# Patient Record
Sex: FEMALE | Race: BLACK OR AFRICAN AMERICAN | Employment: FULL TIME | URBAN - METROPOLITAN AREA
[De-identification: names, ages, dates, MRNs, and addresses within clinical notes are randomized per-mention and may not be internally consistent; named-entity substitution may affect disease eponyms.]

---
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
## 2020-03-18 ENCOUNTER — TELEPHONE (OUTPATIENT)
Dept: OBGYN CLINIC | Age: 20
End: 2020-03-18

## 2020-03-18 NOTE — TELEPHONE ENCOUNTER
Kim Means MD Debrah Hearing, RN   Caller: Unspecified (Today, 11:09 AM)             Please send in rx for diflucan 150mg PO 1 tab every 3 days for 2 doses. #2 tabs. If she is not better in 1 week, she should call for appt     This nurse called the patient back and she went to [facility] er and was given a prescription for Flagyl and told her she had BV. This nurse did not sent the prescription for diflucan.     patient verbalized understanding.     ABRAHAM

## 2020-03-18 NOTE — TELEPHONE ENCOUNTER
Call received tracy 11:05  SP patient      23year old patient last seen in the office on 11/29/2019      Patient calling to say that she has no itching but has fishy odor and thick white discharge. Patient thinks she has a yeast infection and states monistat does not work for her. Patient calling also about abnormal bleeding between her periods that is heavy and she is changing her tampon and pad every 1.5 hours. Patient reports when she is bleeding she has cramping at 6 on the pain scale of 1-10. Patient reports she has had the abnormal bleeding for about 4-5months    Patient is currently not bleeding and does not have cramping    Patient is requesting to be seen. ? Ultrasound?  Ov    Please advise    Pharmacy confirmed

## 2020-03-18 NOTE — TELEPHONE ENCOUNTER
Clary, MD Johnnie Doan, RN   Caller: Unspecified (Today, 11:09 AM)             Call in rx if she requests and if she feels her sx are urgent she can schedule an appt but please advise her we have very limited staff in the office right now due to [place]       what medication do you want to to have sent in for the patient     Please provide name of medication and signature, dosage

## 2020-04-14 ENCOUNTER — VIRTUAL VISIT (OUTPATIENT)
Dept: OBGYN CLINIC | Age: 20
End: 2020-04-14

## 2020-04-14 ENCOUNTER — TELEPHONE (OUTPATIENT)
Dept: OBGYN CLINIC | Age: 20
End: 2020-04-14

## 2020-04-14 VITALS — BODY MASS INDEX: 32.18 KG/M2 | HEIGHT: 67 IN | WEIGHT: 205 LBS

## 2020-04-14 DIAGNOSIS — B37.9 YEAST INFECTION: Primary | ICD-10-CM

## 2020-04-14 RX ORDER — FLUCONAZOLE 150 MG/1
150 TABLET ORAL DAILY
Qty: 1 TAB | Refills: 0 | Status: SHIPPED | OUTPATIENT
Start: 2020-04-14 | End: 2020-04-15

## 2020-04-14 NOTE — PROGRESS NOTES
[facility]    Coco Rausch is a 23 y.o. female who was seen by synchronous (real-time) audio-video technology on 2020. Constitutional  · Appearance: well-nourished, well developed, alert, in no acute distress    HENT  · Head and Face: appears normal    Chest  · Respiratory Effort: normal effort, non-labored breathing    Skin  · General Inspection: no rash, no discolorations, no lesions identified on visible areas    Neurologic  · No gross deficits    Psychiatric  · Mental Status:  · Orientation: grossly oriented to person, place and time  · Mood and Affect: mood normal, affect appropriate, behavior/speech/dress appropriate      We discussed the expected course, resolution and complications of the diagnosis(es) in detail. Medication risks, benefits, costs, interactions, and alternatives were discussed as indicated. I advised her to contact the office if her condition worsens, changes or fails to improve as anticipated. She expressed understanding with the diagnosis(es) and plan. Pursuant to the emergency declaration under the [address] [place] [address], [facility] waiver authority and the [employer] and [employer] Act, this Virtual  Visit was conducted, with patient's consent, to reduce the patient's risk of exposure to COVID-19 and provide continuity of care for an established patient. Services were provided through a video synchronous discussion virtually to substitute for in-person clinic visit. Vaginitis  CC: Vaginal discharge    HPI: Ms. Coco Rausch is a 23 y.o. [de-identified]  female presenting for evaluation of vaginal discharge. She has no additional complaints. She has not had previous treatment for this problem. Onset: 4 days  Location: vaginal  Quality: thick  Severity: bothersome  Timin days ago    Odor: no  Relation to menses? no  Pruritis? no  Irritation? yes  New lesions? no  Dysuria? no  Pain? no    History of recurrent vaginal/vulvar infections? yes  History of recent STIs? no  New partners? yes   She uses condoms or barrier contraception:  never    OB History        0    Para   0    Term   0       0    AB   0    Living   0       SAB   0    TAB   0    Ectopic   0    Molar   0    Multiple   0    Live Births   0                Past Medical History:   Diagnosis Date    Chlamydia     Depression     Overweight (BMI 25.0-29. 9)        History reviewed. No pertinent surgical history.     Family History   Problem Relation Age of Onset    No Known Problems Mother     Colon Cancer Father     Kidney Disease Maternal Uncle     Kidney Disease Maternal Grandfather     Diabetes Paternal Grandmother        Social History     Socioeconomic History    Marital status: SINGLE     Spouse name: Not on file    Number of children: Not on file    Years of education: Not on file    Highest education level: Not on file   Occupational History    Not on file   Social Needs    Financial resource strain: Not on file    Food insecurity     Worry: Not on file     Inability: Not on file    Transportation needs     Medical: Not on file     Non-medical: Not on file   Tobacco Use    Smoking status: Never Smoker    Smokeless tobacco: Never Used   Substance and Sexual Activity    Alcohol use: No    Drug use: No    Sexual activity: Yes     Partners: Male     Birth control/protection: None   Lifestyle    Physical activity     Days per week: Not on file     Minutes per session: Not on file    Stress: Not on file   Relationships    Social connections     Talks on phone: Not on file     Gets together: Not on file     Attends Shinto service: Not on file     Active member of club or organization: Not on file     Attends meetings of clubs or organizations: Not on file     Relationship status: Not on file    Intimate partner violence     Fear of current or ex partner: Not on file     Emotionally abused: Not on file     Physically abused: Not on file     Forced sexual activity: Not on file   Other Topics Concern    Not on file   Social History Narrative    Senior in [place]. Wants to go to college for nursing degree. Either [employer] or [employer]. Current Outpatient Medications   Medication Sig Dispense Refill    metroNIDAZOLE (FLAGYL) 500 mg tablet Take 4 tablets now.   Then take 1 tablet twice daily x 7 days 18 Tab 0       Allergies   Allergen Reactions    Cherry Flavor Other (comments)     Throat swells       Review of Systems - History obtained from the patient  Constitutional: negative for weight loss, fever, night sweats  HEENT: negative for hearing loss, earache, congestion, snoring, sorethroat  CV: negative for chest pain, palpitations, edema  Resp: negative for cough, shortness of breath, wheezing  GI: negative for change in bowel habits, abdominal pain, black or bloody stools  : negative for frequency, dysuria, hematuria, + vaginal discharge as HPI  MSK: negative for back pain, joint pain, muscle pain  Skin :negative for itching, rash, hives  Neuro: negative for dizziness, headache, confusion, weakness  Psych: negative for anxiety, depression, change in mood  Heme/lymph: negative for bleeding, bruising, pallor    Physical Exam      Results for orders placed or performed in visit on 11/29/19   NUSWAB VAGINOSIS + CANDIDA   Result Value Ref Range    Atopobium vaginae Moderate - 1 Score    BVAB 2 Moderate - 1 Score    Megasphaera 1 Low - 0 Score    C. albicans, GIUSEPPE Negative Negative    C. glabrata, GIUSEPPE Negative Negative   NUSWAB VAGINITIS PLUS   Result Value Ref Range    Atopobium vaginae CANCELED Score    BVAB 2 CANCELED Score    Megasphaera 1 CANCELED Score    C. albicans, GIUSEPPE CANCELED     C. glabrata, GIUSEPPE CANCELED     T. vaginalis, GIUSEPPE Negative Negative    C. trachomatis, GIUSEPPE Negative Negative    N. gonorrhoeae, GIUSEPPE Negative Negative   SPECIMEN STATUS REPORT   Result Value Ref Range SPECIMEN STATUS REPORT COMMENT    AMB POC URINE PREGNANCY TEST, VISUAL COLOR COMPARISON   Result Value Ref Range    VALID INTERNAL CONTROL POC Yes     HCG urine, Ql. (POC) Negative Negative       Assessment/Plan:  Vaginitis   Rx for diflucan sent. Counseled on safe sexual practices (barrier contraception). Reviewed vulvar/vaginal hygiene and irritant avoidance. RTC: for annual , or sooner prn for problems or concerns. Handouts and instructions provided.     Jesús Wilson  4/14/2020  1:22 PM    Signed By: Eloy Norton MD     April 14, 2020

## 2020-04-14 NOTE — PATIENT INSTRUCTIONS

## 2020-04-14 NOTE — TELEPHONE ENCOUNTER
SP patient (sending to work in [employer])      Patient recently was seen at [facility] for [address] and was given Flagyl. She now feels she has cleared from the [address] but feels itchy with white thick discharge. She feels that she is irritated and has a possible yeast infection. Monistat 3 did not improve things. Please advise. Requesting treatment for yeast to try.       [address] [facility]

## 2020-04-15 ENCOUNTER — TELEPHONE (OUTPATIENT)
Dept: OBGYN CLINIC | Age: 20
End: 2020-04-15

## 2020-04-15 NOTE — TELEPHONE ENCOUNTER
The medicine has not had time to work. No face to face visit here.  If she wants that she can [facility].

## 2020-04-15 NOTE — TELEPHONE ENCOUNTER
Patient of Evelin Paulson. She is calling rosi this morning saying that she was seen through telemedicine visit and her yeast issues are not gone. She thinks the medication did not work. I explained that the medication does not work that fast.    In speaking with patient, she had a new sexual partner that she had unprotected intercourse with. Today she continues to have vaginal irritation, discharge (white thick), no odor, no itching and no burning. No urinary pain.       Please advise, as now she is requesting a face to face visit and I know she has not given the medication time to take effect if truly is yeast.

## 2020-07-07 ENCOUNTER — OFFICE VISIT (OUTPATIENT)
Dept: OBGYN CLINIC | Age: 20
End: 2020-07-07

## 2020-07-07 VITALS
WEIGHT: 229 LBS | DIASTOLIC BLOOD PRESSURE: 70 MMHG | SYSTOLIC BLOOD PRESSURE: 116 MMHG | HEIGHT: 67 IN | BODY MASS INDEX: 35.94 KG/M2

## 2020-07-07 DIAGNOSIS — Z11.3 SCREENING FOR VENEREAL DISEASE: Primary | ICD-10-CM

## 2020-07-07 DIAGNOSIS — E66.01 SEVERE OBESITY (HCC): ICD-10-CM

## 2020-07-07 DIAGNOSIS — N89.8 VAGINAL DISCHARGE: ICD-10-CM

## 2020-07-07 DIAGNOSIS — N89.8 VAGINAL ODOR: ICD-10-CM

## 2020-07-07 NOTE — PROGRESS NOTES
Problem Visit    Carlton Carmona is a 23 y.o.  A1 presenting for problem visit. Her main concern today is white, vaginal discharge and fishy odor. +sexually active, not using protection. Also with HMB (regular cycle), clots and cramping. She is not interested in hormonal contraception to regulate cycles as she wishes to conceive. Reports 1 year of unprotected IC prior to sab, and has now been ttc for several months since that time without resulting pregnancy. Ob/Gyn Hx:   A1 - 1 SAB (early chemical pregnancy)  LMP-20  Menses- regular  Contraception-none  STI- Denies  ? SA-yes    Health maintenance:  Pap-not indicated  Gardasil-0/3    Past Medical History:   Diagnosis Date    Chlamydia     Depression     Overweight (BMI 25.0-29. 9)        No past surgical history on file.     Family History   Problem Relation Age of Onset    No Known Problems Mother     Colon Cancer Father     Kidney Disease Maternal Uncle     Kidney Disease Maternal Grandfather     Diabetes Paternal Grandmother        Social History     Socioeconomic History    Marital status: SINGLE     Spouse name: Not on file    Number of children: Not on file    Years of education: Not on file    Highest education level: Not on file   Occupational History    Not on file   Social Needs    Financial resource strain: Not on file    Food insecurity     Worry: Not on file     Inability: Not on file    Transportation needs     Medical: Not on file     Non-medical: Not on file   Tobacco Use    Smoking status: Never Smoker    Smokeless tobacco: Never Used   Substance and Sexual Activity    Alcohol use: No    Drug use: No    Sexual activity: Yes     Partners: Male     Birth control/protection: None   Lifestyle    Physical activity     Days per week: Not on file     Minutes per session: Not on file    Stress: Not on file   Relationships    Social connections     Talks on phone: Not on file     Gets together: Not on file Attends Mu-ism service: Not on file     Active member of club or organization: Not on file     Attends meetings of clubs or organizations: Not on file     Relationship status: Not on file    Intimate partner violence     Fear of current or ex partner: Not on file     Emotionally abused: Not on file     Physically abused: Not on file     Forced sexual activity: Not on file   Other Topics Concern    Not on file   Social History Narrative    Senior in [place]. Wants to go to college for nursing degree. Either [employer] or [employer] [facility] [employer]. Current Outpatient Medications   Medication Sig Dispense Refill    metroNIDAZOLE (FLAGYL) 500 mg tablet Take 4 tablets now.   Then take 1 tablet twice daily x 7 days 18 Tab 0       Allergies   Allergen Reactions    Cherry Flavor Other (comments)     Throat swells       Review of Systems - History obtained from the patient  Constitutional: negative for weight loss, fever, night sweats  HEENT: negative for hearing loss, earache, congestion, snoring, sorethroat  CV: negative for chest pain, palpitations, edema  Resp: negative for cough, shortness of breath, wheezing  GI: negative for change in bowel habits, abdominal pain, black or bloody stools  : negative for frequency, dysuria, hematuria, vaginal discharge, +vaginal discharge and odor  MSK: negative for back pain, joint pain, muscle pain  Breast: negative for breast lumps, nipple discharge, galactorrhea  Skin :negative for itching, rash, hives  Neuro: negative for dizziness, headache, confusion, weakness  Psych: negative for anxiety, depression, change in mood  Heme/lymph: negative for bleeding, bruising, pallor    Physical Exam  Visit Vitals  /70 (BP 1 Location: Left arm, BP Patient Position: Sitting)   Ht 5' 7\" (1.702 m)   Wt 229 lb (103.9 kg)   LMP 06/24/2020   BMI 35.87 kg/m²       Constitutional  · Appearance: well-nourished, well developed, alert, in no acute distress    HENT  · Head and Face: appears normal    Chest  · Respiratory Effort: non-labored breathing  · Auscultation: CTAB, normal breath sounds    Cardiovascular  · Heart:  · Auscultation: regular rate and rhythm without murmur  · Extremities: no peripheral edema  ·   Gastrointestinal  · Abdominal Examination: abdomen non-tender to palpation, normal bowel sounds, no masses present  · Liver and spleen: no hepatomegaly present, spleen not palpable  · Hernias: no hernias identified    Genitourinary  · External Genitalia: normal appearance for age, no discharge present, no tenderness present, no inflammatory lesions present, no masses present, no atrophy present  · Vagina: normal vaginal vault without central or paravaginal defects, no inflammatory lesions present, no masses present, small amount of thin white discharge, minimal vaginal erythema  · Bladder: non-tender to palpation  · Urethra: appears normal  · Cervix: normal   · Uterus: normal size, shape and consistency  · Adnexa: no adnexal tenderness present, no adnexal masses present  · Perineum: perineum within normal limits, no evidence of trauma, no rashes or skin lesions present    Skin  · General Inspection: no rash, no lesions identified    Neurologic/Psychiatric  · Mental Status:  · Orientation: grossly oriented to person, place and time  · Mood and Affect: mood normal, affect appropriate      Assessment/Plan:  23 y.o. with c/o malodorous vaginal discharge.      -nuswab plus sent today (r/o BV, yeast, and STIs), treatment pending results  -counseled on good hygiene, avoidance of douching, safe sexual practices, condom use, etc  -advised initiation of PNV and toxin avoidance if ttc    RTC: for AE or sooner prn    Rosemarie Yin MD  7/7/2020  2:57 PM

## 2020-07-14 LAB
A VAGINAE DNA VAG QL NAA+PROBE: ABNORMAL SCORE
BVAB2 DNA VAG QL NAA+PROBE: ABNORMAL SCORE
C ALBICANS DNA VAG QL NAA+PROBE: POSITIVE
C GLABRATA DNA VAG QL NAA+PROBE: NEGATIVE
C TRACH DNA VAG QL NAA+PROBE: NEGATIVE
MEGA1 DNA VAG QL NAA+PROBE: ABNORMAL SCORE
N GONORRHOEA DNA VAG QL NAA+PROBE: NEGATIVE
T VAGINALIS DNA VAG QL NAA+PROBE: NEGATIVE

## 2020-07-14 NOTE — PROGRESS NOTES
+BV. Please inform patient and send Rx for flagyl 500mg BID x7 days to patient's pharmacy. Please advise no ETOH with this medication. + yeast. Please inform patient and send Rx for diflucan 150mg PO x1 (with 1 refill) to patient's pharmacy. Thank you.

## 2020-07-15 RX ORDER — FLUCONAZOLE 150 MG/1
150 TABLET ORAL DAILY
Qty: 1 TAB | Refills: 1 | Status: SHIPPED | OUTPATIENT
Start: 2020-07-15 | End: 2020-07-16

## 2020-07-15 RX ORDER — METRONIDAZOLE 500 MG/1
500 TABLET ORAL 2 TIMES DAILY
Qty: 14 TAB | Refills: 0 | Status: SHIPPED | OUTPATIENT
Start: 2020-07-15 | End: 2020-07-22

## 2021-02-04 ENCOUNTER — OFFICE VISIT (OUTPATIENT)
Dept: OBGYN CLINIC | Age: 21
End: 2021-02-04
Payer: MEDICAID

## 2021-02-04 VITALS
BODY MASS INDEX: 35.31 KG/M2 | DIASTOLIC BLOOD PRESSURE: 84 MMHG | SYSTOLIC BLOOD PRESSURE: 126 MMHG | HEIGHT: 67 IN | WEIGHT: 225 LBS

## 2021-02-04 DIAGNOSIS — N93.9 ABNORMAL UTERINE BLEEDING (AUB): ICD-10-CM

## 2021-02-04 DIAGNOSIS — R10.2 PELVIC PAIN IN FEMALE: ICD-10-CM

## 2021-02-04 DIAGNOSIS — Z72.51 UNPROTECTED SEXUAL INTERCOURSE: Primary | ICD-10-CM

## 2021-02-04 LAB
HCG URINE, QL. (POC): NEGATIVE
VALID INTERNAL CONTROL?: YES

## 2021-02-04 PROCEDURE — 81025 URINE PREGNANCY TEST: CPT | Performed by: OBSTETRICS & GYNECOLOGY

## 2021-02-04 PROCEDURE — 99214 OFFICE O/P EST MOD 30 MIN: CPT | Performed by: OBSTETRICS & GYNECOLOGY

## 2021-02-04 NOTE — PATIENT INSTRUCTIONS
Pelvic Exam: Care Instructions Your Care Instructions When your doctor examines all of your pelvic organs, it's called a pelvic exam. Two good reasons to have this kind of exam are to check for sexually transmitted infections (STIs) and to get a Pap test. A Pap test is also called a Pap smear. It checks for early changes that can lead to cancer of the cervix. Sometimes a pelvic exam is part of a regular checkup. Your doctor may ask you to avoid vaginal sex, tampons, vaginal medicines, vaginal sprays or powders, and douching for 1 to 2 days before the test. 
Other times, women have this kind of exam at any time of the month. This is because they have pelvic pain, bleeding, or discharge. Or they may have another pelvic problem. Before your exam, it's important to share some information with your doctor. For example, if you are a survivor of rape or sexual abuse, you can talk about any concerns you may have. Your doctor will also want to know if you are pregnant or use birth control. And he or she will want to hear about any problems, surgeries, or procedures you have had in your pelvic area. You will also need to tell your doctor when your last period was. Follow-up care is a key part of your treatment and safety. Be sure to make and go to all appointments, and call your doctor if you are having problems. It's also a good idea to know your test results and keep a list of the medicines you take. How is a pelvic exam done? · During a pelvic exam, you will: ? Take off your clothes below the waist. You will get a paper or cloth cover to put over the lower half of your body. If this is regular checkup, you may undress completely and put on a gown. ? Lie on your back on an exam table. Your feet will be raised above you. Stirrups will support your feet. · The doctor will: ? Ask you to relax your knees. Your knees need to lean out, toward the walls. ? Check the opening of your vagina for sores or swelling. ? Gently put a tool called a speculum into your vagina. It opens the vagina a little bit. You will feel some pressure. But if you are relaxed, it will not hurt. It lets your doctor see inside the vagina. ? Use a small brush, spatula, or swab to get a sample of cells, if you are having a Pap test or culture. The doctor then removes the speculum. ? Put on gloves and put one or two fingers of one hand into your vagina. The other hand goes on your lower belly. This lets your doctor feel your pelvic organs. You will probably feel some pressure. Try to stay relaxed. ? Put one gloved finger into your rectum and one into your vagina, if needed. This can also help check your pelvic organs. This exam takes about 10 minutes. At the end, you will get a washcloth or tissue to clean your vaginal area. You can then get dressed. Why is a pelvic exam done? A pelvic exam may be done: · As part of a woman's regular physical checkup. The exam may include a Pap test. 
· To check for vaginal infection. · To check for sexually transmitted infections, such as chlamydia or herpes. · To help find the cause of abnormal uterine bleeding. · To look for problems like uterine fibroids, ovarian cysts, or uterine prolapse. · To find the cause of pelvic or belly pain. · Before inserting an intrauterine device (IUD) for birth control. · To collect evidence if you've been sexually assaulted. What are the risks of a pelvic exam? 
There is a small chance that the doctor will find something on a pelvic exam that would not have caused a problem. This is called overdiagnosis. It could lead to tests or treatment you don't need. When should you call for help? Watch closely for changes in your health, and be sure to contact your doctor if you have any problems. Where can you learn more? Go to http://www.gray.com/ Enter C278 in the search box to learn more about \"Pelvic Exam: Care Instructions. \" Current as of: November 8, 2019               Content Version: 12.6 © 3643-6915 [employer]. Care instructions adapted under license by [employer] (which disclaims liability or warranty for this information). If you have questions about a medical condition or this instruction, always ask your healthcare professional. [address] any warranty or liability for your use of this information.

## 2021-02-04 NOTE — PROGRESS NOTES
Pelvic Pain    CC: Pelvic Pain    HPI: Ms. Terese Schaffer is a 21 y.o.  female presenting for evaluation of pelvic pain. Patient's last menstrual period was 2021 (exact date). .  She has not yet been evaluated for pelvic pain. Patient states she is having some irregular cycles- states she doesn't want any birth control. Her main concern is cramping with IC. She is concerned about her fertility. Hx of one SAB. Regular menses. Past Medical History:   Diagnosis Date    Chlamydia     Depression     Overweight (BMI 25.0-29. 9)        History reviewed. No pertinent surgical history.     Family History   Problem Relation Age of Onset    No Known Problems Mother     Colon Cancer Father     Kidney Disease Maternal Uncle     Kidney Disease Maternal Grandfather     Diabetes Paternal Grandmother        Social History     Socioeconomic History    Marital status: SINGLE     Spouse name: Not on file    Number of children: Not on file    Years of education: Not on file    Highest education level: Not on file   Occupational History    Not on file   Social Needs    Financial resource strain: Not on file    Food insecurity     Worry: Not on file     Inability: Not on file    Transportation needs     Medical: Not on file     Non-medical: Not on file   Tobacco Use    Smoking status: Never Smoker    Smokeless tobacco: Never Used   Substance and Sexual Activity    Alcohol use: No    Drug use: No    Sexual activity: Yes     Partners: Male     Birth control/protection: None   Lifestyle    Physical activity     Days per week: Not on file     Minutes per session: Not on file    Stress: Not on file   Relationships    Social connections     Talks on phone: Not on file     Gets together: Not on file     Attends Sikhism service: Not on file     Active member of club or organization: Not on file     Attends meetings of clubs or organizations: Not on file     Relationship status: Not on file   Felipe Marie Intimate partner violence     Fear of current or ex partner: Not on file     Emotionally abused: Not on file     Physically abused: Not on file     Forced sexual activity: Not on file   Other Topics Concern    Not on file   Social History Narrative    Senior in [place]. Wants to go to [employer] for nursing degree. Either [employer] or [facility] [employer]. Current Outpatient Medications   Medication Sig Dispense Refill    metroNIDAZOLE (FLAGYL) 500 mg tablet Take 4 tablets now.   Then take 1 tablet twice daily x 7 days 18 Tab 0       Allergies   Allergen Reactions    Cherry Flavor Other (comments)     Throat swells         Physical Exam    Visit Vitals  /84 (BP 1 Location: Left upper arm, BP Patient Position: Sitting)   Ht 5' 7\" (1.702 m)   Wt 225 lb (102.1 kg)   LMP 01/16/2021 (Exact Date)   BMI 35.24 kg/m²       HENT  · Head and Face: appears normal    Gastrointestinal  · Abdominal Examination: abdomen non-tender to palpation, normal bowel sounds, no masses present  · Liver and spleen: no hepatomegaly present, spleen not palpable  · Hernias: no hernias identified    Genitourinary  · External Genitalia: normal appearance for age, no discharge present, no tenderness present, no inflammatory lesions present, no masses present, no atrophy present  · Vagina: normal vaginal vault without central or paravaginal defects, no discharge present, no inflammatory lesions present, no masses present  · Bladder: non-tender to palpation  · Urethra: appears normal  · Cervix: normal, no cervicitis, no CMT  · Uterus: normal size, shape and consistency, mobile  · Adnexa: no adnexal tenderness present, no adnexal masses present  · Perineum: perineum within normal limits, no evidence of trauma, no rashes or skin lesions present    Skin  · General Inspection: no rash, no lesions identified    Neurologic/Psychiatric  · Mental Status:  · Orientation: grossly oriented to person, place and time  · Mood and Affect: mood normal, affect appropriate    Results for orders placed or performed in visit on 07/07/20   NUSWAB VAGINITIS PLUS   Result Value Ref Range    Atopobium vaginae High - 2 (A) Score    BVAB 2 High - 2 (A) Score    Megasphaera 1 High - 2 (A) Score    C. albicans, GIUSEPPE Positive (A) Negative    C. glabrata, GIUSEPPE Negative Negative    T. vaginalis, GIUSEPPE Negative Negative    C. trachomatis, GIUSEPPE Negative Negative    N. gonorrhoeae, GIUSEPPE Negative Negative       No results found. Assessment/Plan:  21 y.o. with cramping with IC. Urine culture sent  STI testing sent  Discussed Day 3 fertility labs and OPKs-she wishes to do this now. She will call on cycle day one to set up day three labs: TSH, FSH, LH, E2, AMH.   [facility]  2/4/2021  8:53 AM     Signed By: Angelica Garcia MD     February 4, 2021

## 2021-02-06 LAB
BACTERIA SPEC CULT: NORMAL
SERVICE CMNT-IMP: NORMAL

## 2021-02-09 LAB
A VAGINAE DNA VAG QL NAA+PROBE: ABNORMAL SCORE
BVAB2 DNA VAG QL NAA+PROBE: ABNORMAL SCORE
C ALBICANS DNA VAG QL NAA+PROBE: ABNORMAL
C GLABRATA DNA VAG QL NAA+PROBE: ABNORMAL
C TRACH DNA VAG QL NAA+PROBE: POSITIVE
MEGA1 DNA VAG QL NAA+PROBE: ABNORMAL SCORE
N GONORRHOEA DNA VAG QL NAA+PROBE: NEGATIVE
T VAGINALIS DNA VAG QL NAA+PROBE: NEGATIVE

## 2021-02-10 RX ORDER — AZITHROMYCIN 500 MG/1
1000 TABLET, FILM COATED ORAL ONCE
Qty: 2 TAB | Refills: 0 | Status: SHIPPED | OUTPATIENT
Start: 2021-02-10 | End: 2021-02-10

## 2021-02-10 RX ORDER — FLUCONAZOLE 150 MG/1
150 TABLET ORAL DAILY
Qty: 1 TAB | Refills: 0 | Status: SHIPPED | OUTPATIENT
Start: 2021-02-10 | End: 2021-02-11

## 2021-03-25 ENCOUNTER — OFFICE VISIT (OUTPATIENT)
Dept: OBGYN CLINIC | Age: 21
End: 2021-03-25
Payer: MEDICAID

## 2021-03-25 VITALS
HEIGHT: 67 IN | DIASTOLIC BLOOD PRESSURE: 60 MMHG | BODY MASS INDEX: 35.24 KG/M2 | SYSTOLIC BLOOD PRESSURE: 110 MMHG | WEIGHT: 224.5 LBS

## 2021-03-25 DIAGNOSIS — Z11.3 SCREENING FOR STD (SEXUALLY TRANSMITTED DISEASE): Primary | ICD-10-CM

## 2021-03-25 PROCEDURE — 99213 OFFICE O/P EST LOW 20 MIN: CPT | Performed by: OBSTETRICS & GYNECOLOGY

## 2021-03-25 NOTE — PROGRESS NOTES
Problem Visit   CC: Desires STI testing    HPI: Ms. Jenny Portillo is a 21 y.o.  female who desires STI testing. She has had previous treatment for this problem. Pt stated she went to the ER and received treatment. She believes her partner at the time got treated as well. Onset: Today  Location: Desires vaginal  Context: Is not sexually active  No longer with previous partner. OB History        1    Para   0    Term   0       0    AB   1    Living   0       SAB   1    TAB   0    Ectopic   0    Molar   0    Multiple   0    Live Births   0                Past Medical History:   Diagnosis Date    Chlamydia     Depression     Overweight (BMI 25.0-29. 9)        History reviewed. No pertinent surgical history.     Family History   Problem Relation Age of Onset    No Known Problems Mother     Colon Cancer Father     Kidney Disease Maternal Uncle     Kidney Disease Maternal Grandfather     Diabetes Paternal Grandmother        Social History     Socioeconomic History    Marital status: SINGLE     Spouse name: Not on file    Number of children: Not on file    Years of education: Not on file    Highest education level: Not on file   Occupational History    Not on file   Social Needs    Financial resource strain: Not on file    Food insecurity     Worry: Not on file     Inability: Not on file    Transportation needs     Medical: Not on file     Non-medical: Not on file   Tobacco Use    Smoking status: Never Smoker    Smokeless tobacco: Never Used   Substance and Sexual Activity    Alcohol use: Yes     Frequency: Monthly or less    Drug use: No    Sexual activity: Not Currently     Partners: Male     Birth control/protection: None   Lifestyle    Physical activity     Days per week: Not on file     Minutes per session: Not on file    Stress: Not on file   Relationships    Social connections     Talks on phone: Not on file     Gets together: Not on file     Attends Yarsani service: Not on file     Active member of club or organization: Not on file     Attends meetings of clubs or organizations: Not on file     Relationship status: Not on file    Intimate partner violence     Fear of current or ex partner: Not on file     Emotionally abused: Not on file     Physically abused: Not on file     Forced sexual activity: Not on file   Other Topics Concern    Not on file   Social History Narrative    Senior in [place]. Wants to go to college for nursing degree. Either [employer] or [employer].            Allergies   Allergen Reactions    Cherry Flavor Other (comments)     Throat swells       Physical Exam    Visit Vitals  /60 (BP 1 Location: Right arm, BP Patient Position: Sitting)   Ht 5' 7\" (1.702 m)   Wt 224 lb 8 oz (101.8 kg)   LMP 03/10/2021 (Exact Date)   BMI 35.16 kg/m²       HENT  · Head and Face: appears normal    Genitourinary  · External Genitalia: normal appearance for age, no discharge present, no tenderness present, no inflammatory lesions present, no masses present, no atrophy present  · Vagina: normal vaginal vault without central or paravaginal defects, no inflammatory lesions present, no masses present, no discharge present  · Bladder: non-tender to palpation  · Urethra: appears normal  · Cervix: normal, no cervicitis, no CMT  · Perineum: perineum within normal limits, no evidence of trauma, no rashes or skin lesions present    Skin  · General Inspection: no rash, no lesions identified    Neurologic/Psychiatric  · Mental Status:  · Orientation: grossly oriented to person, place and time  · Mood and Affect: mood normal, affect appropriate    Results for orders placed or performed in visit on 02/04/21   CULTURE, URINE    Specimen: Urine   Result Value Ref Range    Special Requests: NO SPECIAL REQUESTS      Culture result: No growth (<1,000 CFU/ML)     NUSWAB VAGINITIS PLUS   Result Value Ref Range    Atopobium vaginae Comment Score    BVAB 2 Comment Score Megasphaera 1 Comment Score    C. albicans, GIUSEPPE Comment Negative    C. glabrata, GIUSEPPE Comment Negative    T. vaginalis, GIUSEPPE Negative Negative    C. trachomatis, GUISEPPE Positive (A) Negative    N. gonorrhoeae, GIUSEPPE Negative Negative   AMB POC URINE PREGNANCY TEST, VISUAL COLOR COMPARISON   Result Value Ref Range    VALID INTERNAL CONTROL POC Yes     HCG urine, Ql. (POC) Negative Negative         Assessment/Plan:  WANDA sent  Advised we only call with abnormal results, she voices understanding. Discussed safe sex.     Deepak Alejandra  3/25/2021  1:09 PM    Signed By: Brent Quezada MD     March 25, 2021

## 2021-03-25 NOTE — PATIENT INSTRUCTIONS
Safer Sex: Care Instructions  Your Care Instructions  Safer sex is a way to reduce your risk of getting an infection spread through sex. It can also help prevent pregnancy. Most infections that are spread through sex, also called sexually transmitted infections or STIs, can be cured. But some can decrease your chances of getting pregnant if they are not treated early. Others, such as herpes, have no cure. And some, such as HIV, can be deadly. Several products can help you practice safer sex and reduce your chance of STIs. One of the best is a condom. There are condoms for men and for women. The female condom is a tube of soft plastic with a closed end that is placed deep into the vagina. You can use a special rubber sheet (dental dam) for protection during oral sex. Disposable gloves can keep your hands from touching blood, semen, or other body fluids that can carry infections. Remember that birth control methods such as diaphragms, IUDs, foams, and birth control pills do not stop you from getting STIs. Follow-up care is a key part of your treatment and safety. Be sure to make and go to all appointments, and call your doctor if you are having problems. It's also a good idea to know your test results and keep a list of the medicines you take. How can you care for yourself at home? · Think about getting shots to prevent hepatitis A and hepatitis B. These two diseases can be spread through sex. You also can get hepatitis A if you eat infected food. · Use condoms or female condoms each time and every time you have sex. · Learn the right way to use a male condom:  ? Condoms come in several sizes. Make sure you use the right size. A condom that is too small can break easily. A condom that is too big can slip off during sex. Use a new condom each time you have sex. ? Be careful not to poke a hole in the condom when you open the wrapper. ? Squeeze the tip of the condom to keep out air.   ? Pull down the loose skin (foreskin) from the head of an uncircumcised penis. ? While squeezing the tip of the condom, unroll it all the way down to the base of the firm penis. ? Never use petroleum jelly (such as Vaseline), grease, hand lotion, baby oil, or anything with oil in it. These products can make holes in the condom. ? After sex, hold the condom on your penis as you remove your penis from your partner. This will keep semen from spilling out of the condom. · Learn to use a female condom:  ? You can put in a female condom up to 8 hours before sex. ? Squeeze the smaller ring at the closed end and insert it deep into the vagina. The larger ring at the open end should stay outside the vagina. ? During sex, make sure the penis goes into the condom. ? After the penis is removed, close the open end of the condom by twisting it. Remove the condom. · Do not use a female condom and male condom at the same time. · Do not have sex with anyone who has symptoms of an STI, such as sores on the genitals or mouth. The herpes virus that causes cold sores can spread to and from the penis and vagina. · Do not drink a lot of alcohol or use drugs before sex. This can cause you to let down your guard and not practice safer sex. · Having one sex partner (who does not have STIs and does not have sex with anyone else) is a sure way to avoid STIs. · Talk to your partner before you have sex. Find out if he or she has or is at risk for any STI. Keep in mind that a person may be able to spread an STI even if he or she does not have symptoms. You and your partner may want to get an HIV test. You should get tested again 6 months later. Where can you learn more? Go to http://www.gray.com/  Enter B608 in the search box to learn more about \"Safer Sex: Care Instructions. \"  Current as of: February 26, 2020               Content Version: 12.6  © 2424-8698 [employer]    Care instructions adapted under license by [employer] (which disclaims liability or warranty for this information). If you have questions about a medical condition or this instruction, always ask your healthcare professional. [address] any warranty or liability for your use of this information.

## 2021-03-28 LAB
A VAGINAE DNA VAG QL NAA+PROBE: ABNORMAL SCORE
BVAB2 DNA VAG QL NAA+PROBE: ABNORMAL SCORE
C ALBICANS DNA VAG QL NAA+PROBE: NEGATIVE
C GLABRATA DNA VAG QL NAA+PROBE: NEGATIVE
C TRACH DNA VAG QL NAA+PROBE: NEGATIVE
MEGA1 DNA VAG QL NAA+PROBE: ABNORMAL SCORE
N GONORRHOEA DNA VAG QL NAA+PROBE: NEGATIVE
T VAGINALIS DNA VAG QL NAA+PROBE: NEGATIVE

## 2021-03-29 RX ORDER — METRONIDAZOLE 500 MG/1
500 TABLET ORAL 2 TIMES DAILY
Qty: 14 TAB | Refills: 0 | Status: SHIPPED | OUTPATIENT
Start: 2021-03-29 | End: 2021-04-05

## 2021-03-29 NOTE — PROGRESS NOTES
Per Dr. Shalom Sinha- called and spoke with patient. Informed her of lab results and medication being sent in. Patient verbalized understanding and had no questions at this time. Patient was already aware of results.

## 2021-07-26 ENCOUNTER — HOSPITAL ENCOUNTER (EMERGENCY)
Age: 21
Discharge: HOME | End: 2021-07-26
Attending: PEDIATRICS
Payer: MEDICAID

## 2021-07-26 VITALS
SYSTOLIC BLOOD PRESSURE: 133 MMHG | HEIGHT: 67 IN | OXYGEN SATURATION: 100 % | DIASTOLIC BLOOD PRESSURE: 78 MMHG | HEART RATE: 90 BPM | TEMPERATURE: 98 F | BODY MASS INDEX: 35.19 KG/M2 | RESPIRATION RATE: 18 BRPM | WEIGHT: 224.21 LBS

## 2021-07-26 DIAGNOSIS — B96.89 BV (BACTERIAL VAGINOSIS): ICD-10-CM

## 2021-07-26 DIAGNOSIS — R10.2 VAGINAL PAIN: ICD-10-CM

## 2021-07-26 DIAGNOSIS — L73.9 FOLLICULITIS: Primary | ICD-10-CM

## 2021-07-26 DIAGNOSIS — N76.0 BV (BACTERIAL VAGINOSIS): ICD-10-CM

## 2021-07-26 LAB
APPEARANCE UR: ABNORMAL
BACTERIA URNS QL MICRO: ABNORMAL /HPF
BILIRUB UR QL: NEGATIVE
COLOR UR: ABNORMAL
EPITH CASTS URNS QL MICRO: ABNORMAL /LPF
GLUCOSE UR STRIP.AUTO-MCNC: NEGATIVE MG/DL
HCG UR QL: NEGATIVE
HGB UR QL STRIP: ABNORMAL
HYALINE CASTS URNS QL MICRO: ABNORMAL /LPF (ref 0–5)
KETONES UR QL STRIP.AUTO: NEGATIVE MG/DL
LEUKOCYTE ESTERASE UR QL STRIP.AUTO: ABNORMAL
NITRITE UR QL STRIP.AUTO: NEGATIVE
PH UR STRIP: 6 [PH] (ref 5–8)
PROT UR STRIP-MCNC: NEGATIVE MG/DL
RBC #/AREA URNS HPF: ABNORMAL /HPF (ref 0–5)
SP GR UR REFRACTOMETRY: 1.02 (ref 1–1.03)
UR CULT HOLD, URHOLD: NORMAL
UROBILINOGEN UR QL STRIP.AUTO: 1 EU/DL (ref 0.2–1)
WBC URNS QL MICRO: ABNORMAL /HPF (ref 0–4)

## 2021-07-26 PROCEDURE — 81001 URINALYSIS AUTO W/SCOPE: CPT

## 2021-07-26 PROCEDURE — 99283 EMERGENCY DEPT VISIT LOW MDM: CPT

## 2021-07-26 PROCEDURE — 81025 URINE PREGNANCY TEST: CPT

## 2021-07-26 RX ORDER — SULFAMETHOXAZOLE AND TRIMETHOPRIM 800; 160 MG/1; MG/1
1 TABLET ORAL 2 TIMES DAILY
Qty: 14 TABLET | Refills: 0 | Status: SHIPPED | OUTPATIENT
Start: 2021-07-26 | End: 2021-08-02

## 2021-07-26 RX ORDER — FLUCONAZOLE 150 MG/1
150 TABLET ORAL ONCE
Qty: 1 TABLET | Refills: 0 | Status: SHIPPED | OUTPATIENT
Start: 2021-07-26 | End: 2021-07-26

## 2021-07-26 RX ORDER — LIDOCAINE HCL 4 G/100G
CREAM TOPICAL
Qty: 1 TUBE | Refills: 0 | Status: SHIPPED | OUTPATIENT
Start: 2021-07-26

## 2021-07-26 RX ORDER — ACETAMINOPHEN 325 MG/1
TABLET ORAL
COMMUNITY

## 2021-07-26 NOTE — ED TRIAGE NOTES
Pt reports having seen [facility] on Wednesday for vaginal discharge. Started on med for BV and stopped taking it due to pain on skin of labia. States she is awaiting results for Herpes swab.

## 2021-07-26 NOTE — ED NOTES
I have reviewed discharge instructions with the patient. The patient verbalized understanding. The Patient and Family member wereeducated on frequent/proper hand-washing techniques, Standard precautions, avoid crowds and persons with known infections and staying current with immunizations. The Patient and Family member were given time and space to ask questions.

## 2021-07-26 NOTE — ED PROVIDER NOTES
The history is provided by the patient. Groin Pain  This is a new problem. Episode onset: 4-5 days. The problem occurs constantly. The problem has been gradually worsening (seen at [facility] for yeast infection. Also positive for BV. Started flagyl but stopped yesterday. Negative Trich/Chlam/GC. HSV swab sent and pending. Had a small ulcer them. Seems more spread out now. ). Pertinent negatives include no chest pain, no abdominal pain, no headaches and no shortness of breath. Exacerbated by: urinating. IMM UTD    Past Medical History:   Diagnosis Date    Chlamydia     Depression     Overweight (BMI 25.0-29. 9)        No past surgical history on file. Family History:   Problem Relation Age of Onset    No Known Problems Mother     Colon Cancer Father     Kidney Disease Maternal Uncle     Kidney Disease Maternal Grandfather     Diabetes Paternal Grandmother        Social History     Socioeconomic History    Marital status: SINGLE     Spouse name: Not on file    Number of children: Not on file    Years of education: Not on file    Highest education level: Not on file   Occupational History    Not on file   Tobacco Use    Smoking status: Never Smoker    Smokeless tobacco: Never Used   Vaping Use    Vaping Use: Never used   Substance and Sexual Activity    Alcohol use: Yes    Drug use: No    Sexual activity: Not Currently     Partners: Male     Birth control/protection: None   Other Topics Concern    Not on file   Social History Narrative    Senior in [employer]. Wants to go to college for nursing degree. Either [employer] or [employer]. Social Determinants of Health     Financial Resource Strain:     Difficulty of Paying Living Expenses:    Food Insecurity:     Worried About Running Out of Food in the Last Year:     [address] in the Last Year:    Transportation Needs:     Lack of Transportation (Medical):      Lack of Transportation (Non-Medical):    Physical Activity:     Days of Exercise per Week:     Minutes of Exercise per Session:    Stress:     Feeling of Stress :    Social Connections:     Frequency of Communication with Friends and Family:     Frequency of Social Gatherings with Friends and Family:     Attends Congregation Services:     Active Member of Clubs or Organizations:     Attends Club or Organization Meetings:     Marital Status:    Intimate Partner Violence:     Fear of Current or Ex-Partner:     Emotionally Abused:     Physically Abused:     Sexually Abused: ALLERGIES: Cherry flavor    Review of Systems   Constitutional: Negative for fatigue and fever. HENT: Negative for sore throat. Respiratory: Negative for chest tightness and shortness of breath. Cardiovascular: Negative for chest pain. Gastrointestinal: Negative for abdominal pain, nausea and vomiting. Genitourinary: Positive for dyspareunia, dysuria, genital sores and vaginal discharge (improved). Negative for difficulty urinating, flank pain, menstrual problem, vaginal bleeding and vaginal pain. Musculoskeletal: Negative for back pain. Skin: Positive for rash. Negative for wound. Allergic/Immunologic: Negative for immunocompromised state. Neurological: Negative for light-headedness and headaches. Hematological: Does not bruise/bleed easily. Psychiatric/Behavioral: Negative for confusion. Vitals:    07/26/21 0028   BP: 133/78   Pulse: 90   Resp: 18   Temp: 98 °F (36.7 °C)   SpO2: 100%   Weight: 101.7 kg (224 lb 3.3 oz)   Height: 5' 7\" (1.702 m)            Physical Exam   Physical Exam   Constitutional: Appears well-developed and well-nourished. active. No distress. HENT:   Head: NCAT  Nose: Nose normal. No nasal discharge. Mouth/Throat: Mucous membranes are moist. Pharynx is normal.   Eyes: Conjunctivae are normal. Right eye exhibits no discharge. Left eye exhibits no discharge. Neck: Normal range of motion. Neck supple.    Cardiovascular: Normal rate, regular rhythm, S1 normal and S2 normal. No murmur   2+ distal pulses   Pulmonary/Chest: Effort normal and breath sounds normal. No nasal flaring or stridor. No respiratory distress. no wheezes. no rhonchi. no rales. no retraction. Abdominal: Soft. . No tenderness. no guarding. No hernia. No masses or HSM  Genitourinary:  Newly shaved. Small follicular papule on left lower labial. No ulcers. No discharge. Musculoskeletal: Normal range of motion. no edema, no tenderness, no deformity and no signs of injury. Lymphadenopathy:     no cervical adenopathy. Neurological:  alert. normal strength. normal muscle tone. No focal defecits  Skin: Skin is warm and dry. Capillary refill takes less than 3 seconds. Turgor is normal. No petechiae, no purpura and no rash noted. No cyanosis. MDM     Just had full pelvic this week. Neg GC/Chlam. OON flagyl for BV and recommended to finish course. Starting bactrim for folliculitis. No abscess. HSV pending from . Will have patient follow that up. Licocaine cram prn and diflucan as Abx cause frequent Yeast infections. ICD-10-CM ICD-9-CM   1. Folliculitis  R90.5 574.4   2. BV (bacterial vaginosis)  N76.0 616.10    B96.89 041.9   3. Vaginal pain  R10.2 625.9       Current Discharge Medication List      START taking these medications    Details   trimethoprim-sulfamethoxazole (Bactrim DS) 160-800 mg per tablet Take 1 Tablet by mouth two (2) times a day for 7 days. Qty: 14 Tablet, Refills: 0  Start date: 7/26/2021, End date: 8/2/2021      fluconazole (Diflucan) 150 mg tablet Take 1 Tablet by mouth once for 1 dose. FDA advises cautious prescribing of oral fluconazole in pregnancy. Qty: 1 Tablet, Refills: 0  Start date: 7/26/2021, End date: 7/26/2021      lidocaine (XYLOCAINE) 4 % topical cream Apply to affected area three times a day as needed.   Qty: 1 Tube, Refills: 0  Start date: 7/26/2021             Follow-up Information     Follow up With Specialties Details Why Contact Info Gynecology   As needed, If symptoms worsen           I have reviewed discharge instructions with the patient. The patient verbalized understanding.    Blanca Toro M.D.     Procedures

## 2022-03-19 PROBLEM — N76.0 ACUTE VAGINITIS: Status: ACTIVE | Noted: 2018-02-15

## 2022-03-19 PROBLEM — E66.01 SEVERE OBESITY (HCC): Status: ACTIVE | Noted: 2020-07-07

## 2022-05-01 NOTE — ED NOTES
Bed: 14main  Expected date:   Expected time:   Means of arrival:   Comments:  1st   Pt discharged home. Pt acting age appropriately, respirations regular and unlabored, cap refill less than two seconds. Skin pink, dry and warm. Lungs clear bilaterally. No further complaints at this time. Patient verbalized understanding of discharge paperwork and has no further questions at this time. Education provided about continuation of care, follow up care and medication administration. Patient able to provide teach back about discharge instructions.

## 2023-01-26 ENCOUNTER — OFFICE VISIT (OUTPATIENT)
Dept: INTERNAL MEDICINE CLINIC | Age: 23
End: 2023-01-26
Payer: MEDICAID

## 2023-01-26 VITALS
HEIGHT: 67 IN | DIASTOLIC BLOOD PRESSURE: 76 MMHG | RESPIRATION RATE: 18 BRPM | TEMPERATURE: 97.6 F | HEART RATE: 74 BPM | BODY MASS INDEX: 34.06 KG/M2 | SYSTOLIC BLOOD PRESSURE: 122 MMHG | WEIGHT: 217 LBS | OXYGEN SATURATION: 100 %

## 2023-01-26 DIAGNOSIS — G89.29 CHRONIC PAIN OF BOTH SHOULDERS: ICD-10-CM

## 2023-01-26 DIAGNOSIS — M54.6 CHRONIC THORACIC BACK PAIN, UNSPECIFIED BACK PAIN LATERALITY: ICD-10-CM

## 2023-01-26 DIAGNOSIS — G89.29 CHRONIC THORACIC BACK PAIN, UNSPECIFIED BACK PAIN LATERALITY: ICD-10-CM

## 2023-01-26 DIAGNOSIS — M25.511 CHRONIC PAIN OF BOTH SHOULDERS: ICD-10-CM

## 2023-01-26 DIAGNOSIS — E66.9 OBESITY (BMI 30-39.9): Primary | ICD-10-CM

## 2023-01-26 DIAGNOSIS — Z76.89 ENCOUNTER TO ESTABLISH CARE: ICD-10-CM

## 2023-01-26 DIAGNOSIS — N64.89 BILATERAL PENDULOUS BREASTS: ICD-10-CM

## 2023-01-26 DIAGNOSIS — M25.512 CHRONIC PAIN OF BOTH SHOULDERS: ICD-10-CM

## 2023-01-26 PROCEDURE — 99202 OFFICE O/P NEW SF 15 MIN: CPT | Performed by: NURSE PRACTITIONER

## 2023-01-26 NOTE — PROGRESS NOTES
Duane Harris (: 2000) is a 25 y.o. female, new patient, here for evaluation of the following chief complaint(s):  New Patient (Concerns about weight. Pt stated both breast hurt this has been over a year. )       ASSESSMENT/PLAN:  Below is the assessment and plan developed based on review of pertinent history, physical exam, labs, studies, and medications. 1. Obesity (BMI 30-39.9)  2. Encounter to establish care  3. Bilateral pendulous breasts  4. Chronic pain of both shoulders  5. Chronic thoracic back pain, unspecified back pain laterality    Return in about 3 months (around 2023) for OV- BMI, wt loss med start. Pt asked to complete follow by next visit: advised to aim to exercise at least [address] for 20-30 minutes to start with goal of 150 min of moderate activity. Continue healthy diet. Call insurance about meds if feels assist is needed by next OV. Deferred any other concerns r/t breast at this time. SUBJECTIVE/OBJECTIVE:  HPI    Pt here to establish care. Has concern for increased weight since graduating high school and large breast.    Weight Loss Counseling:  Pt here to discuss elevated BMI. Would like to lose weight. Eating 2 x daily and 2 snacks. Exercising 0 weekly. Following healthy diet, yogurt, granola bars, and breakfast drinks. Not as active as she was in high school when she was a cheerleader and ran track. Now in school and works in office. Having larger breast for past 3 years, size B before that. Associated with shoulder, bra line back pain, and breast discomfort over past 1 year. Has lost ~ 20 lbs already. Concerned she since she hasn't had kids yet. Not considering breast reduction for this reason.    Change in Patient Weight by Encounter (320 )    (2023) Office Visit  Last Recorded Weight: 98.4 kg (217 lb)     Review of Systems  Constitutional: negative for fevers, chills, anorexia and weight loss  Respiratory:  negative for cough, hemoptysis, dyspnea, and wheezing  CV:   negative for chest pain, palpitations, and lower extremity edema  GI:   negative for nausea, vomiting, diarrhea, abdominal pain, and melena  Endo:               negative for polyuria,polydipsia,polyphagia, and heat intolerance  Genitourinary: negative for frequency, urgency, dysuria, retention, and hematuria  Integument:  negative for rash, ulcerations, and pruritus  Hematologic:  negative for easy bruising and bleeding  Musculoskel: negative for arthralgias, muscle weakness,and joint pain/swelling  Neurological:  negative for headaches, dizziness, vertigo,and memory/gait problems  Behavl/Psych: negative for feelings of anxiety, depression, suicide, and mood changes    Past Medical History:   Diagnosis Date    Chlamydia     Depression     Overweight (BMI 25.0-29. 9)        History reviewed. No pertinent surgical history. Current Outpatient Medications   Medication Sig    acetaminophen (TYLENOL) 325 mg tablet Take  by mouth every four (4) hours as needed for Pain. (Patient not taking: Reported on 1/26/2023)    lidocaine (XYLOCAINE) 4 % topical cream Apply to affected area three times a day as needed. (Patient not taking: Reported on 1/26/2023)     No current facility-administered medications for this visit. Visit Vitals  /76 (BP 1 Location: Right arm, BP Patient Position: Sitting, BP Cuff Size: Adult)   Pulse 74   Temp 97.6 °F (36.4 °C) (Temporal)   Resp 18   Ht 5' 7\" (1.702 m)   Wt 217 lb (98.4 kg)   LMP 01/19/2023   SpO2 100%   BMI 33.99 kg/m²       Wt Readings from Last 3 Encounters:   01/26/23 217 lb (98.4 kg)   07/26/21 224 lb 3.3 oz (101.7 kg)   03/25/21 224 lb 8 oz (101.8 kg)         Physical Exam:   General appearance - alert, well appearing, and in no distress. Mental status - A/O x 4,normal mood and affect. Chest - CTA. Symmetric chest rise. No wheezing. No distress. Heart - Normal rate & rhythm. Normal S1 & S2. No MGR. Abdomen- Soft, round. Non-distended, NT. No pulsatile masses or hernias. Ext-  No pedal edema, clubbing, or cyanosis. Skin-Warm and dry. No hyperpigmentation, ulcerations, or suspicious lesions. Neuro - Normal speech, no focal findings or movement disorder. Normal strength, gait, and muscle tone. An electronic signature was used to authenticate this note.   -- Alanis Benjamin, NP

## 2023-01-26 NOTE — PROGRESS NOTES
Verified Name and  of the patient. Chief Complaint   Patient presents with    New Patient     Concerns about weight. Pt stated both breast hurt this has been over a year. Health maintenance will be addressed with Primary Care Provider at next visit. Vitals:    23 0811   BP: 122/76   Pulse: 74   Resp: 18   Temp: 97.6 °F (36.4 °C)   TempSrc: Temporal   SpO2: 100%   Weight: 217 lb (98.4 kg)   PainSc:   0 - No pain   LMP: 2023       1. Have you been to the ER, urgent care clinic since your last visit? Hospitalized since your last visit? No    2. Have you seen or consulted any other health care providers outside of the [address] [place] [address] since your last visit? Include any pap smears or colon screening.  No

## 2023-01-26 NOTE — PATIENT INSTRUCTIONS
Start walking 3 TIMES WEEKLY for 20 minutes, as you get more comfortable increase your PACE and then the amount of TIME. The goal is 5 days weekly for 30 minutes. Call your insurance company to see if bariatric/obesity medicine visits are COVERED. Then check to see if each of these meds are covered and the OUT OF POCKET cost to you for each if covered. Nina Noble the ones that are and cross the ones that are NOT) WEGOVY, QSYMIA, SAXENDA, PLENITY, CONTRAVE, or ADIPEX are covered. Then let me know which you are able to get and we can determine the best fit at your next appt.

## 2023-04-27 ENCOUNTER — OFFICE VISIT (OUTPATIENT)
Dept: INTERNAL MEDICINE CLINIC | Age: 23
End: 2023-04-27
Payer: MEDICAID

## 2023-04-27 VITALS
HEART RATE: 82 BPM | SYSTOLIC BLOOD PRESSURE: 128 MMHG | OXYGEN SATURATION: 99 % | TEMPERATURE: 98.4 F | BODY MASS INDEX: 34.37 KG/M2 | WEIGHT: 219 LBS | RESPIRATION RATE: 16 BRPM | DIASTOLIC BLOOD PRESSURE: 59 MMHG | HEIGHT: 67 IN

## 2023-04-27 DIAGNOSIS — E66.9 OBESITY (BMI 30-39.9): ICD-10-CM

## 2023-04-27 DIAGNOSIS — Z13.29 SCREENING FOR ENDOCRINE, NUTRITIONAL, METABOLIC AND IMMUNITY DISORDER: ICD-10-CM

## 2023-04-27 DIAGNOSIS — Z13.228 SCREENING FOR ENDOCRINE, NUTRITIONAL, METABOLIC AND IMMUNITY DISORDER: ICD-10-CM

## 2023-04-27 DIAGNOSIS — Z13.220 SCREENING FOR LIPID DISORDERS: ICD-10-CM

## 2023-04-27 DIAGNOSIS — E66.9 OBESITY (BMI 30-39.9): Primary | ICD-10-CM

## 2023-04-27 DIAGNOSIS — Z13.0 SCREENING FOR ENDOCRINE, NUTRITIONAL, METABOLIC AND IMMUNITY DISORDER: ICD-10-CM

## 2023-04-27 DIAGNOSIS — Z71.3 WEIGHT LOSS COUNSELING, ENCOUNTER FOR: ICD-10-CM

## 2023-04-27 DIAGNOSIS — Z13.21 SCREENING FOR ENDOCRINE, NUTRITIONAL, METABOLIC AND IMMUNITY DISORDER: ICD-10-CM

## 2023-04-27 LAB
ALBUMIN SERPL-MCNC: 4 G/DL (ref 3.5–5)
ALBUMIN/GLOB SERPL: 1.3 (ref 1.1–2.2)
ALP SERPL-CCNC: 56 U/L (ref 45–117)
ALT SERPL-CCNC: 19 U/L (ref 12–78)
ANION GAP SERPL CALC-SCNC: 4 MMOL/L (ref 5–15)
AST SERPL-CCNC: 13 U/L (ref 15–37)
BASOPHILS # BLD: 0 K/UL (ref 0–0.1)
BASOPHILS NFR BLD: 0 % (ref 0–1)
BILIRUB SERPL-MCNC: 0.2 MG/DL (ref 0.2–1)
BUN SERPL-MCNC: 8 MG/DL (ref 6–20)
BUN/CREAT SERPL: 13 (ref 12–20)
CALCIUM SERPL-MCNC: 10.8 MG/DL (ref 8.5–10.1)
CHLORIDE SERPL-SCNC: 104 MMOL/L (ref 97–108)
CHOLEST SERPL-MCNC: 163 MG/DL
CO2 SERPL-SCNC: 29 MMOL/L (ref 21–32)
CREAT SERPL-MCNC: 0.6 MG/DL (ref 0.55–1.02)
DIFFERENTIAL METHOD BLD: ABNORMAL
EOSINOPHIL # BLD: 0.1 K/UL (ref 0–0.4)
EOSINOPHIL NFR BLD: 1 % (ref 0–7)
ERYTHROCYTE [DISTWIDTH] IN BLOOD BY AUTOMATED COUNT: 12.2 % (ref 11.5–14.5)
EST. AVERAGE GLUCOSE BLD GHB EST-MCNC: 88 MG/DL
GLOBULIN SER CALC-MCNC: 3.2 G/DL (ref 2–4)
GLUCOSE SERPL-MCNC: 96 MG/DL (ref 65–100)
HBA1C MFR BLD: 4.7 % (ref 4–5.6)
HCT VFR BLD AUTO: 41.5 % (ref 35–47)
HDLC SERPL-MCNC: 48 MG/DL
HDLC SERPL: 3.4 (ref 0–5)
HGB BLD-MCNC: 13.1 G/DL (ref 11.5–16)
IMM GRANULOCYTES # BLD AUTO: 0 K/UL (ref 0–0.04)
IMM GRANULOCYTES NFR BLD AUTO: 0 % (ref 0–0.5)
LDLC SERPL CALC-MCNC: 96.2 MG/DL (ref 0–100)
LYMPHOCYTES # BLD: 2.9 K/UL (ref 0.8–3.5)
LYMPHOCYTES NFR BLD: 29 % (ref 12–49)
MCH RBC QN AUTO: 28.6 PG (ref 26–34)
MCHC RBC AUTO-ENTMCNC: 31.6 G/DL (ref 30–36.5)
MCV RBC AUTO: 90.6 FL (ref 80–99)
MONOCYTES # BLD: 0.4 K/UL (ref 0–1)
MONOCYTES NFR BLD: 4 % (ref 5–13)
NEUTS SEG # BLD: 6.7 K/UL (ref 1.8–8)
NEUTS SEG NFR BLD: 66 % (ref 32–75)
NRBC # BLD: 0 K/UL (ref 0–0.01)
NRBC BLD-RTO: 0 PER 100 WBC
PLATELET # BLD AUTO: 425 K/UL (ref 150–400)
PMV BLD AUTO: 9.3 FL (ref 8.9–12.9)
POTASSIUM SERPL-SCNC: 3.8 MMOL/L (ref 3.5–5.1)
PROT SERPL-MCNC: 7.2 G/DL (ref 6.4–8.2)
RBC # BLD AUTO: 4.58 M/UL (ref 3.8–5.2)
SODIUM SERPL-SCNC: 137 MMOL/L (ref 136–145)
TRIGL SERPL-MCNC: 94 MG/DL (ref ?–150)
TSH SERPL DL<=0.05 MIU/L-ACNC: 1.86 UIU/ML (ref 0.36–3.74)
VLDLC SERPL CALC-MCNC: 18.8 MG/DL
WBC # BLD AUTO: 10.2 K/UL (ref 3.6–11)

## 2023-04-27 PROCEDURE — 99214 OFFICE O/P EST MOD 30 MIN: CPT | Performed by: NURSE PRACTITIONER

## 2023-04-27 RX ORDER — SEMAGLUTIDE 0.5 MG/.5ML
0.5 INJECTION, SOLUTION SUBCUTANEOUS
Qty: 4 EACH | Refills: 2 | Status: SHIPPED | OUTPATIENT
Start: 2023-04-27

## 2023-04-27 NOTE — PATIENT INSTRUCTIONS
Try eating a handful of blueberries daily to help with your constipation and GI distress, as long as you are not allergic. Be sure to stay hydrated, aiming to drink at least 8 glasses or 4 BOTTLES of water daily.

## 2023-04-27 NOTE — PROGRESS NOTES
Pt is here for   Chief Complaint   Patient presents with    Follow-up     BMI wt loss med start         1. \"Have you been to the ER, urgent care clinic since your last visit? Hospitalized since your last visit? \" No    2. \"Have you seen or consulted any other health care providers outside of the [address] [place] [address] since your last visit? \" No     3. For patients aged 39-70: Has the patient had a colonoscopy / FIT/ Cologuard? NA - based on age      If the patient is female:    4. For patients aged 41-77: Has the patient had a mammogram within the past 2 years? NA - based on age or sex      11. For patients aged 21-65: Has the patient had a pap smear?  No

## 2023-04-27 NOTE — PROGRESS NOTES
Jad Kenyon (: 2000) is a 25 y.o. female, new patient, here for evaluation of the following chief complaint(s):  Follow-up (BMI wt loss med start)         ASSESSMENT/PLAN:  Below is the assessment and plan developed based on review of pertinent history, physical exam, labs, studies, and medications. 1. Obesity (BMI 30-39.9)  -     semaglutide, weight loss, (Wegovy) 0.5 mg/0.5 mL pnij; 0.5 mg by SubCUTAneous route every seven (7) days. Take 0.25 mg for the first 1-2 weeks, then increase to 0.5 mg weekly. It helps to eat just before taking and avoid dairy. Indications: weight loss management for an obese person, Normal, Disp-4 Each, R-2  -     METABOLIC PANEL, COMPREHENSIVE; Future  -     CBC WITH AUTOMATED DIFF; Future  -     HEMOGLOBIN A1C WITH EAG; Future  -     LIPID PANEL; Future  -     TSH 3RD GENERATION; Future  2. Weight loss counseling, encounter for  -     semaglutide, weight loss, (Wegovy) 0.5 mg/0.5 mL pnij; 0.5 mg by SubCUTAneous route every seven (7) days. Take 0.25 mg for the first 1-2 weeks, then increase to 0.5 mg weekly. It helps to eat just before taking and avoid dairy. Indications: weight loss management for an obese person, Normal, Disp-4 Each, R-2  3. Screening for endocrine, nutritional, metabolic and immunity disorder  -     METABOLIC PANEL, COMPREHENSIVE; Future  -     CBC WITH AUTOMATED DIFF; Future  -     HEMOGLOBIN A1C WITH EAG; Future  -     TSH 3RD GENERATION; Future  4. Screening for lipid disorders  -     LIPID PANEL; Future      Return in about 6 weeks (around 2023) for OV-BMI, wt loss counseling visit. Pt asked to complete follow by next visit: advised to aim to exercise at least [address] for 20-30 minutes to start with goal of 150 min of moderate activity. Continue healthy diet. Call insurance about meds if feels assist is needed by next OV. Deferred any other concerns r/t breast at this time.       SUBJECTIVE/OBJECTIVE:  HPI    Weight Loss Counseling:  Pt here to discuss elevated BMI. Would like to lose weight. Eating 3 x daily. 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 Just graduated, so able to devote time to exercise now. Following healthy, low-fat, low-carb, plant-based diet. Would like to start Wegovy to help, covered by insurance when called. Change in Patient Weight by Encounter (365 Day Lookback)    (04/27/2023) Office Visit  Last Recorded Weight: 99.3 kg (219 lb)             Percent Change: +0.91%   [01/26/2023 to 04/27/2023 (91 Days)]    (01/26/2023) Office Visit  Last Recorded Weight: 98.4 kg (217 lb)             Review of Systems  Constitutional: negative for fevers, chills, anorexia and weight loss  Respiratory:  negative for cough, hemoptysis, dyspnea, and wheezing  CV:   negative for chest pain, palpitations, and lower extremity edema  GI:   negative for nausea, vomiting, diarrhea, abdominal pain, and melena  Endo:               negative for polyuria,polydipsia,polyphagia, and heat intolerance  Genitourinary: negative for frequency, urgency, dysuria, retention, and hematuria  Integument:  negative for rash, ulcerations, and pruritus  Hematologic:  negative for easy bruising and bleeding  Musculoskel: negative for arthralgias, muscle weakness,and joint pain/swelling  Neurological:  negative for headaches, dizziness, vertigo,and memory/gait problems  Behavl/Psych: negative for feelings of anxiety, depression, suicide, and mood changes    Past Medical History:   Diagnosis Date    Chlamydia     Depression     Overweight (BMI 25.0-29. 9)        History reviewed. No pertinent surgical history. Current Outpatient Medications   Medication Sig    semaglutide, weight loss, (Wegovy) 0.5 mg/0.5 mL pnij 0.5 mg by SubCUTAneous route every seven (7) days. Take 0.25 mg for the first 1-2 weeks, then increase to 0.5 mg weekly. It helps to eat just before taking and avoid dairy. Indications: weight loss management for an obese person    acetaminophen (TYLENOL) 325 mg tablet Take  by mouth every four (4) hours as needed for Pain. (Patient not taking: Reported on 1/26/2023)    lidocaine (XYLOCAINE) 4 % topical cream Apply to affected area three times a day as needed. (Patient not taking: Reported on 1/26/2023)     No current facility-administered medications for this visit. Visit Vitals  BP (!) 128/59 (BP 1 Location: Left upper arm, BP Patient Position: Sitting, BP Cuff Size: Large adult)   Pulse 82   Temp 98.4 °F (36.9 °C) (Temporal)   Resp 16   Ht 5' 7\" (1.702 m)   Wt 219 lb (99.3 kg)   LMP  (LMP Unknown)   SpO2 99%   BMI 34.30 kg/m²       Wt Readings from Last 3 Encounters:   04/27/23 219 lb (99.3 kg)   01/26/23 217 lb (98.4 kg)   07/26/21 224 lb 3.3 oz (101.7 kg)         Physical Exam:   General appearance - alert, well appearing, and in no distress. Mental status - A/O x 4,normal mood and affect. Chest - Symmetric chest rise. No wheezing. No distress. Heart - Normal rate. Abdomen- Soft, round. Non-distended, NT. No pulsatile masses or hernias. Ext-  No pedal edema, clubbing, or cyanosis. Skin-Warm and dry. No hyperpigmentation, ulcerations, or suspicious lesions. Neuro - Normal speech, no focal findings or movement disorder. Normal strength, gait, and muscle tone. An electronic signature was used to authenticate this note.   -- [employer], NP

## 2023-04-28 PROBLEM — D75.839 THROMBOCYTOSIS: Status: ACTIVE | Noted: 2023-04-28

## 2023-04-28 RX ORDER — PREDNISONE 5 MG/1
TABLET ORAL
Qty: 91 TABLET | Refills: 0 | Status: CANCELLED | OUTPATIENT
Start: 2023-04-28

## 2023-04-28 NOTE — PROGRESS NOTES
Overall, your LABS look GREAT/NORMAL! Keep up the good work! Your platelets are elevated. This is not uncommon for some and you can try to help lower this value by donating blood or increasing your intake of these celestino, ginkgo, garlic, ginseng, fish oil, feverfew, fenugreek, licorice, vitamin E, coenzyme Q10, glucosamine, and evening primrose oil      Continue current regimen. Eat a balanced diet, low-carb, low-salt AND exercise at least 150 minutes per week of moderate activity. If you begin to feel short of breath, dizzy, lightheaded, or have chest pain; STOP. If your symptoms DO NOT resolve after several minutes, DO NOT resume activity; you may need to call the office, report to an urgent care facility, or ER for chest pain.

## 2023-04-29 RX ORDER — LIDOCAINE HCL 4% 4 G/100G
CREAM TOPICAL
COMMUNITY
Start: 2021-07-26

## 2023-04-29 RX ORDER — ACETAMINOPHEN 325 MG/1
TABLET ORAL EVERY 4 HOURS PRN
COMMUNITY

## 2023-05-01 ENCOUNTER — TELEPHONE (OUTPATIENT)
Dept: INTERNAL MEDICINE CLINIC | Age: 23
End: 2023-05-01

## 2023-05-03 ENCOUNTER — TELEPHONE (OUTPATIENT)
Dept: INTERNAL MEDICINE CLINIC | Age: 23
End: 2023-05-03

## 2023-05-03 DIAGNOSIS — E66.9 OBESITY (BMI 30-39.9): Primary | ICD-10-CM

## 2023-05-03 DIAGNOSIS — Z71.3 WEIGHT LOSS COUNSELING, ENCOUNTER FOR: ICD-10-CM

## 2023-05-03 RX ORDER — SEMAGLUTIDE 0.5 MG/.5ML
0.5 INJECTION, SOLUTION SUBCUTANEOUS
Qty: 1 EACH | Refills: 0 | Status: SHIPPED | OUTPATIENT
Start: 2023-05-31

## 2023-05-03 RX ORDER — SEMAGLUTIDE 0.25 MG/.5ML
0.25 INJECTION, SOLUTION SUBCUTANEOUS
Qty: 1 EACH | Refills: 0 | Status: SHIPPED | OUTPATIENT
Start: 2023-05-03

## 2023-05-17 ENCOUNTER — TELEPHONE (OUTPATIENT)
Facility: CLINIC | Age: 23
End: 2023-05-17

## 2023-05-17 DIAGNOSIS — E66.9 CLASS 1 OBESITY WITHOUT SERIOUS COMORBIDITY WITH BODY MASS INDEX (BMI) OF 34.0 TO 34.9 IN ADULT, UNSPECIFIED OBESITY TYPE: ICD-10-CM

## 2023-05-17 DIAGNOSIS — E66.9 CLASS 1 OBESITY WITHOUT SERIOUS COMORBIDITY WITH BODY MASS INDEX (BMI) OF 34.0 TO 34.9 IN ADULT, UNSPECIFIED OBESITY TYPE: Primary | ICD-10-CM

## 2023-05-17 DIAGNOSIS — Z71.3 DIETARY COUNSELING AND SURVEILLANCE: ICD-10-CM

## 2023-05-17 RX ORDER — TOPIRAMATE 25 MG/1
25 TABLET ORAL DAILY
Qty: 30 TABLET | Refills: 0 | Status: SHIPPED | OUTPATIENT
Start: 2023-05-17

## 2023-05-17 NOTE — TELEPHONE ENCOUNTER
Pt states Johanna Ahumada was denied by [email or web address].  She wants you to give her something else please and send to  [employer] on file. She updated the pharmacy information on my chart.     Pt # [phone]

## 2023-05-17 NOTE — PROGRESS NOTES
Wegovy denied. Wrote for alternate qsymia meds for wt loss.  Pt already practicing [facility]

## 2024-02-03 NOTE — ED PROVIDER NOTES
Patient is a 12 y.o. female presenting with vaginal discharge. Pediatric Social History:    Vaginal Discharge    Pertinent negatives include no fever, no abdominal pain, no nausea, no vomiting and no dysuria. To ED for evaluation of clear to white discharge, ongoing for about a week. \"bread or yeast'\" odor. Was treated for STD, chlamydia positive Summer 2016, has been with same partner,consenstual sex, not using condoms. No urinary sx. No abdominal pain, n/v.  Normal bowels. No past medical history on file. No past surgical history on file. No family history on file. Social History     Social History    Marital status: SINGLE     Spouse name: N/A    Number of children: N/A    Years of education: N/A     Occupational History    Not on file. Social History Main Topics    Smoking status: Never Smoker    Smokeless tobacco: Not on file    Alcohol use No    Drug use: No    Sexual activity: Not on file     Other Topics Concern    Not on file     Social History Narrative         ALLERGIES: Review of patient's allergies indicates no known allergies. Review of Systems   Constitutional: Negative for fever. HENT: Negative for mouth sores and sore throat. Eyes: Negative for discharge. Respiratory: Negative for cough and shortness of breath. Cardiovascular: Negative for chest pain. Gastrointestinal: Negative for abdominal pain, nausea and vomiting. Genitourinary: Positive for vaginal discharge. Negative for dysuria and urgency. Skin: Negative for rash. Neurological: Negative for weakness, numbness and headaches. All other systems reviewed and are negative. Vitals:    03/08/17 1737   BP: 117/62   Pulse: 104   Temp: 98.1 °F (36.7 °C)   SpO2: 100%   Weight: 78.9 kg   Height: 172.7 cm            Physical Exam   Constitutional: She is oriented to person, place, and time. She appears well-developed and well-nourished. HENT:   Head: Normocephalic.    Mouth/Throat: Oropharynx is clear and moist.   Eyes: Conjunctivae are normal. Pupils are equal, round, and reactive to light. Cardiovascular: Normal rate, regular rhythm and normal heart sounds. No murmur heard. Pulmonary/Chest: Effort normal. She has no wheezes. Abdominal: She exhibits no distension. There is no tenderness. There is no rebound and no guarding. Genitourinary:   Genitourinary Comments: Slight white discharge. No vaginal / labia lesions. No CMT. No adenexal tenderness. Neurological: She is alert and oriented to person, place, and time. No cranial nerve deficit. Skin: Skin is warm. No rash noted. Psychiatric: She has a normal mood and affect. Her behavior is normal.   Nursing note and vitals reviewed.        MDM  ED Course       Procedures Detail Level: Simple Detail Level: Zone

## 2024-09-19 ENCOUNTER — APPOINTMENT (OUTPATIENT)
Facility: HOSPITAL | Age: 24
End: 2024-09-19
Payer: MEDICAID

## 2024-09-19 ENCOUNTER — HOSPITAL ENCOUNTER (EMERGENCY)
Facility: HOSPITAL | Age: 24
Discharge: HOME | End: 2024-09-19
Attending: EMERGENCY MEDICINE
Payer: MEDICAID

## 2024-09-19 VITALS
HEIGHT: 67 IN | BODY MASS INDEX: 34.95 KG/M2 | DIASTOLIC BLOOD PRESSURE: 80 MMHG | OXYGEN SATURATION: 100 % | SYSTOLIC BLOOD PRESSURE: 144 MMHG | TEMPERATURE: 98 F | HEART RATE: 72 BPM | WEIGHT: 222.66 LBS | RESPIRATION RATE: 18 BRPM

## 2024-09-19 DIAGNOSIS — N93.9 VAGINAL BLEEDING: ICD-10-CM

## 2024-09-19 DIAGNOSIS — O20.0 THREATENED MISCARRIAGE: Primary | ICD-10-CM

## 2024-09-19 LAB
ABO + RH BLD: NORMAL
ALBUMIN SERPL-MCNC: 4 G/DL (ref 3.5–5)
ALBUMIN/GLOB SERPL: 1.1 (ref 1.1–2.2)
ALP SERPL-CCNC: 49 U/L (ref 45–117)
ALT SERPL-CCNC: 13 U/L (ref 12–78)
ANION GAP SERPL CALC-SCNC: 2 MMOL/L (ref 2–12)
APPEARANCE UR: CLEAR
AST SERPL-CCNC: 10 U/L (ref 15–37)
BACTERIA URNS QL MICRO: NEGATIVE /HPF
BASOPHILS # BLD: 0 K/UL (ref 0–0.1)
BASOPHILS NFR BLD: 0 % (ref 0–1)
BILIRUB SERPL-MCNC: 0.5 MG/DL (ref 0.2–1)
BILIRUB UR QL: NEGATIVE
BLOOD BANK CMNT PATIENT-IMP: NORMAL
BUN SERPL-MCNC: 7 MG/DL (ref 6–20)
BUN/CREAT SERPL: 11 (ref 12–20)
CALCIUM SERPL-MCNC: 10.6 MG/DL (ref 8.5–10.1)
CHLORIDE SERPL-SCNC: 108 MMOL/L (ref 97–108)
CO2 SERPL-SCNC: 26 MMOL/L (ref 21–32)
COLOR UR: NORMAL
COMMENT:: NORMAL
CREAT SERPL-MCNC: 0.63 MG/DL (ref 0.55–1.02)
DIFFERENTIAL METHOD BLD: ABNORMAL
EOSINOPHIL # BLD: 0 K/UL (ref 0–0.4)
EOSINOPHIL NFR BLD: 0 % (ref 0–7)
EPITH CASTS URNS QL MICRO: NORMAL /LPF
ERYTHROCYTE [DISTWIDTH] IN BLOOD BY AUTOMATED COUNT: 12.6 % (ref 11.5–14.5)
GLOBULIN SER CALC-MCNC: 3.7 G/DL (ref 2–4)
GLUCOSE SERPL-MCNC: 93 MG/DL (ref 65–100)
GLUCOSE UR STRIP.AUTO-MCNC: NEGATIVE MG/DL
HCG SERPL-ACNC: 23 MIU/ML (ref 0–6)
HCG UR QL: POSITIVE
HCG, URINE, POC: POSITIVE
HCT VFR BLD AUTO: 40.4 % (ref 35–47)
HGB BLD-MCNC: 13.2 G/DL (ref 11.5–16)
HGB UR QL STRIP: NEGATIVE
IMM GRANULOCYTES # BLD AUTO: 0 K/UL (ref 0–0.04)
IMM GRANULOCYTES NFR BLD AUTO: 0 % (ref 0–0.5)
KETONES UR QL STRIP.AUTO: NEGATIVE MG/DL
LEUKOCYTE ESTERASE UR QL STRIP.AUTO: NEGATIVE
LYMPHOCYTES # BLD: 2.6 K/UL (ref 0.8–3.5)
LYMPHOCYTES NFR BLD: 28 % (ref 12–49)
Lab: NORMAL
MCH RBC QN AUTO: 29.1 PG (ref 26–34)
MCHC RBC AUTO-ENTMCNC: 32.7 G/DL (ref 30–36.5)
MCV RBC AUTO: 89.2 FL (ref 80–99)
MONOCYTES # BLD: 0.3 K/UL (ref 0–1)
MONOCYTES NFR BLD: 3 % (ref 5–13)
NEGATIVE QC PASS/FAIL: NORMAL
NEUTS SEG # BLD: 6.3 K/UL (ref 1.8–8)
NEUTS SEG NFR BLD: 69 % (ref 32–75)
NITRITE UR QL STRIP.AUTO: NEGATIVE
NRBC # BLD: 0 K/UL (ref 0–0.01)
NRBC BLD-RTO: 0 PER 100 WBC
PH UR STRIP: 5.5 (ref 5–8)
PLATELET # BLD AUTO: 410 K/UL (ref 150–400)
PMV BLD AUTO: 8.9 FL (ref 8.9–12.9)
POSITIVE QC PASS/FAIL: NORMAL
POTASSIUM SERPL-SCNC: 3.6 MMOL/L (ref 3.5–5.1)
PROT SERPL-MCNC: 7.7 G/DL (ref 6.4–8.2)
PROT UR STRIP-MCNC: NEGATIVE MG/DL
RBC # BLD AUTO: 4.53 M/UL (ref 3.8–5.2)
RBC #/AREA URNS HPF: NORMAL /HPF (ref 0–5)
SODIUM SERPL-SCNC: 136 MMOL/L (ref 136–145)
SP GR UR REFRACTOMETRY: 1.03 (ref 1–1.03)
SPECIMEN HOLD: NORMAL
SPECIMEN HOLD: NORMAL
UROBILINOGEN UR QL STRIP.AUTO: 0.2 EU/DL (ref 0.2–1)
WBC # BLD AUTO: 9.2 K/UL (ref 3.6–11)
WBC URNS QL MICRO: NORMAL /HPF (ref 0–4)

## 2024-09-19 PROCEDURE — 76801 OB US < 14 WKS SINGLE FETUS: CPT

## 2024-09-19 PROCEDURE — 86901 BLOOD TYPING SEROLOGIC RH(D): CPT

## 2024-09-19 PROCEDURE — 81001 URINALYSIS AUTO W/SCOPE: CPT

## 2024-09-19 PROCEDURE — 85025 COMPLETE CBC W/AUTO DIFF WBC: CPT

## 2024-09-19 PROCEDURE — 99284 EMERGENCY DEPT VISIT MOD MDM: CPT

## 2024-09-19 PROCEDURE — 86900 BLOOD TYPING SEROLOGIC ABO: CPT

## 2024-09-19 PROCEDURE — 84702 CHORIONIC GONADOTROPIN TEST: CPT

## 2024-09-19 PROCEDURE — 76817 TRANSVAGINAL US OBSTETRIC: CPT

## 2024-09-19 PROCEDURE — 80053 COMPREHEN METABOLIC PANEL: CPT

## 2024-09-19 PROCEDURE — 81025 URINE PREGNANCY TEST: CPT

## 2024-09-19 PROCEDURE — 36415 COLL VENOUS BLD VENIPUNCTURE: CPT

## 2024-09-19 ASSESSMENT — PAIN SCALES - GENERAL: PAINLEVEL_OUTOF10: 0

## 2024-09-21 ENCOUNTER — HOSPITAL ENCOUNTER (EMERGENCY)
Facility: HOSPITAL | Age: 24
Discharge: HOME | End: 2024-09-21
Attending: STUDENT IN AN ORGANIZED HEALTH CARE EDUCATION/TRAINING PROGRAM
Payer: MEDICAID

## 2024-09-21 VITALS
OXYGEN SATURATION: 99 % | HEART RATE: 84 BPM | SYSTOLIC BLOOD PRESSURE: 121 MMHG | BODY MASS INDEX: 35.12 KG/M2 | TEMPERATURE: 98.4 F | RESPIRATION RATE: 18 BRPM | DIASTOLIC BLOOD PRESSURE: 81 MMHG | WEIGHT: 224.21 LBS

## 2024-09-21 DIAGNOSIS — O03.9 FOLLOW-UP VISIT AFTER MISCARRIAGE: Primary | ICD-10-CM

## 2024-09-21 DIAGNOSIS — Z51.89 FOLLOW-UP VISIT AFTER MISCARRIAGE: Primary | ICD-10-CM

## 2024-09-21 LAB
COMMENT:: NORMAL
HCG SERPL-ACNC: 16 MIU/ML (ref 0–6)
SPECIMEN HOLD: NORMAL

## 2024-09-21 PROCEDURE — 84702 CHORIONIC GONADOTROPIN TEST: CPT

## 2024-09-21 PROCEDURE — 99283 EMERGENCY DEPT VISIT LOW MDM: CPT

## 2024-09-21 PROCEDURE — 36415 COLL VENOUS BLD VENIPUNCTURE: CPT

## 2024-09-21 ASSESSMENT — ENCOUNTER SYMPTOMS
ABDOMINAL PAIN: 0
NAUSEA: 0
VOMITING: 0
SHORTNESS OF BREATH: 0
TROUBLE SWALLOWING: 0

## 2024-09-21 ASSESSMENT — PAIN - FUNCTIONAL ASSESSMENT: PAIN_FUNCTIONAL_ASSESSMENT: NONE - DENIES PAIN

## 2024-11-06 ENCOUNTER — APPOINTMENT (OUTPATIENT)
Facility: HOSPITAL | Age: 24
End: 2024-11-06
Payer: MEDICAID

## 2024-11-06 ENCOUNTER — HOSPITAL ENCOUNTER (EMERGENCY)
Facility: HOSPITAL | Age: 24
Discharge: HOME | End: 2024-11-06
Attending: STUDENT IN AN ORGANIZED HEALTH CARE EDUCATION/TRAINING PROGRAM
Payer: MEDICAID

## 2024-11-06 VITALS
HEIGHT: 67 IN | SYSTOLIC BLOOD PRESSURE: 100 MMHG | HEART RATE: 76 BPM | RESPIRATION RATE: 18 BRPM | OXYGEN SATURATION: 99 % | DIASTOLIC BLOOD PRESSURE: 56 MMHG | BODY MASS INDEX: 36.16 KG/M2 | TEMPERATURE: 98.2 F | WEIGHT: 230.38 LBS

## 2024-11-06 DIAGNOSIS — R51.9 ACUTE NONINTRACTABLE HEADACHE, UNSPECIFIED HEADACHE TYPE: Primary | ICD-10-CM

## 2024-11-06 DIAGNOSIS — Z86.69 HISTORY OF BLURRED VISION: ICD-10-CM

## 2024-11-06 LAB
ALBUMIN SERPL-MCNC: 3.7 G/DL (ref 3.5–5)
ALBUMIN/GLOB SERPL: 1 (ref 1.1–2.2)
ALP SERPL-CCNC: 53 U/L (ref 45–117)
ALT SERPL-CCNC: 18 U/L (ref 12–78)
ANION GAP SERPL CALC-SCNC: 3 MMOL/L (ref 2–12)
APPEARANCE UR: ABNORMAL
AST SERPL-CCNC: 11 U/L (ref 15–37)
BACTERIA URNS QL MICRO: NEGATIVE /HPF
BASOPHILS # BLD: 0 K/UL (ref 0–0.1)
BASOPHILS NFR BLD: 0 % (ref 0–1)
BILIRUB SERPL-MCNC: 0.3 MG/DL (ref 0.2–1)
BILIRUB UR QL: NEGATIVE
BUN SERPL-MCNC: 8 MG/DL (ref 6–20)
BUN/CREAT SERPL: 12 (ref 12–20)
CALCIUM SERPL-MCNC: 10.5 MG/DL (ref 8.5–10.1)
CHLORIDE SERPL-SCNC: 107 MMOL/L (ref 97–108)
CO2 SERPL-SCNC: 29 MMOL/L (ref 21–32)
COLOR UR: ABNORMAL
CREAT SERPL-MCNC: 0.67 MG/DL (ref 0.55–1.02)
DIFFERENTIAL METHOD BLD: ABNORMAL
EOSINOPHIL # BLD: 0.1 K/UL (ref 0–0.4)
EOSINOPHIL NFR BLD: 1 % (ref 0–7)
EPITH CASTS URNS QL MICRO: ABNORMAL /LPF
ERYTHROCYTE [DISTWIDTH] IN BLOOD BY AUTOMATED COUNT: 12.3 % (ref 11.5–14.5)
GLOBULIN SER CALC-MCNC: 3.6 G/DL (ref 2–4)
GLUCOSE SERPL-MCNC: 107 MG/DL (ref 65–100)
GLUCOSE UR STRIP.AUTO-MCNC: NEGATIVE MG/DL
HCG UR QL: NEGATIVE
HCT VFR BLD AUTO: 39.4 % (ref 35–47)
HGB BLD-MCNC: 12.7 G/DL (ref 11.5–16)
HGB UR QL STRIP: NEGATIVE
HYALINE CASTS URNS QL MICRO: ABNORMAL /LPF (ref 0–5)
IMM GRANULOCYTES # BLD AUTO: 0 K/UL (ref 0–0.04)
IMM GRANULOCYTES NFR BLD AUTO: 0 % (ref 0–0.5)
KETONES UR QL STRIP.AUTO: NEGATIVE MG/DL
LEUKOCYTE ESTERASE UR QL STRIP.AUTO: NEGATIVE
LYMPHOCYTES # BLD: 3.4 K/UL (ref 0.8–3.5)
LYMPHOCYTES NFR BLD: 34 % (ref 12–49)
MCH RBC QN AUTO: 28.6 PG (ref 26–34)
MCHC RBC AUTO-ENTMCNC: 32.2 G/DL (ref 30–36.5)
MCV RBC AUTO: 88.7 FL (ref 80–99)
MONOCYTES # BLD: 0.5 K/UL (ref 0–1)
MONOCYTES NFR BLD: 5 % (ref 5–13)
NEUTS SEG # BLD: 6.1 K/UL (ref 1.8–8)
NEUTS SEG NFR BLD: 60 % (ref 32–75)
NITRITE UR QL STRIP.AUTO: NEGATIVE
NRBC # BLD: 0 K/UL (ref 0–0.01)
NRBC BLD-RTO: 0 PER 100 WBC
PH UR STRIP: 7.5 (ref 5–8)
PLATELET # BLD AUTO: 441 K/UL (ref 150–400)
PMV BLD AUTO: 8.9 FL (ref 8.9–12.9)
POTASSIUM SERPL-SCNC: 3.8 MMOL/L (ref 3.5–5.1)
PROT SERPL-MCNC: 7.3 G/DL (ref 6.4–8.2)
PROT UR STRIP-MCNC: NEGATIVE MG/DL
RBC # BLD AUTO: 4.44 M/UL (ref 3.8–5.2)
RBC #/AREA URNS HPF: ABNORMAL /HPF (ref 0–5)
SODIUM SERPL-SCNC: 139 MMOL/L (ref 136–145)
SP GR UR REFRACTOMETRY: 1.01 (ref 1–1.03)
SPECIMEN HOLD: NORMAL
UROBILINOGEN UR QL STRIP.AUTO: 0.2 EU/DL (ref 0.2–1)
WBC # BLD AUTO: 10.2 K/UL (ref 3.6–11)
WBC URNS QL MICRO: ABNORMAL /HPF (ref 0–4)

## 2024-11-06 PROCEDURE — 85025 COMPLETE CBC W/AUTO DIFF WBC: CPT

## 2024-11-06 PROCEDURE — 96374 THER/PROPH/DIAG INJ IV PUSH: CPT

## 2024-11-06 PROCEDURE — 99284 EMERGENCY DEPT VISIT MOD MDM: CPT

## 2024-11-06 PROCEDURE — 80053 COMPREHEN METABOLIC PANEL: CPT

## 2024-11-06 PROCEDURE — 81001 URINALYSIS AUTO W/SCOPE: CPT

## 2024-11-06 PROCEDURE — 6370000000 HC RX 637 (ALT 250 FOR IP): Performed by: STUDENT IN AN ORGANIZED HEALTH CARE EDUCATION/TRAINING PROGRAM

## 2024-11-06 PROCEDURE — 81025 URINE PREGNANCY TEST: CPT

## 2024-11-06 PROCEDURE — 70450 CT HEAD/BRAIN W/O DYE: CPT

## 2024-11-06 PROCEDURE — 6360000002 HC RX W HCPCS: Performed by: STUDENT IN AN ORGANIZED HEALTH CARE EDUCATION/TRAINING PROGRAM

## 2024-11-06 PROCEDURE — 96375 TX/PRO/DX INJ NEW DRUG ADDON: CPT

## 2024-11-06 PROCEDURE — 36415 COLL VENOUS BLD VENIPUNCTURE: CPT

## 2024-11-06 RX ORDER — METOCLOPRAMIDE 10 MG/1
10 TABLET ORAL 3 TIMES DAILY PRN
Qty: 60 TABLET | Refills: 0 | Status: SHIPPED | OUTPATIENT
Start: 2024-11-06

## 2024-11-06 RX ORDER — BUTALBITAL, ACETAMINOPHEN AND CAFFEINE 50; 325; 40 MG/1; MG/1; MG/1
1 TABLET ORAL EVERY 4 HOURS PRN
Qty: 60 TABLET | Refills: 0 | Status: SHIPPED | OUTPATIENT
Start: 2024-11-06 | End: 2024-12-06

## 2024-11-06 RX ORDER — DEXAMETHASONE SODIUM PHOSPHATE 10 MG/ML
10 INJECTION, SOLUTION INTRAMUSCULAR; INTRAVENOUS
Status: COMPLETED | OUTPATIENT
Start: 2024-11-06 | End: 2024-11-06

## 2024-11-06 RX ORDER — NAPROXEN 500 MG/1
500 TABLET ORAL 2 TIMES DAILY PRN
Qty: 60 TABLET | Refills: 0 | Status: SHIPPED | OUTPATIENT
Start: 2024-11-06

## 2024-11-06 RX ORDER — BUTALBITAL, ACETAMINOPHEN AND CAFFEINE 50; 325; 40 MG/1; MG/1; MG/1
2 TABLET ORAL
Status: COMPLETED | OUTPATIENT
Start: 2024-11-06 | End: 2024-11-06

## 2024-11-06 RX ORDER — METOCLOPRAMIDE HYDROCHLORIDE 5 MG/ML
10 INJECTION INTRAMUSCULAR; INTRAVENOUS
Status: COMPLETED | OUTPATIENT
Start: 2024-11-06 | End: 2024-11-06

## 2024-11-06 RX ADMIN — BUTALBITAL, ACETAMINOPHEN, AND CAFFEINE 2 TABLET: 325; 50; 40 TABLET ORAL at 17:52

## 2024-11-06 RX ADMIN — DEXAMETHASONE SODIUM PHOSPHATE 10 MG: 10 INJECTION, SOLUTION INTRAMUSCULAR; INTRAVENOUS at 17:52

## 2024-11-06 RX ADMIN — METOCLOPRAMIDE 10 MG: 5 INJECTION, SOLUTION INTRAMUSCULAR; INTRAVENOUS at 17:52

## 2024-11-06 ASSESSMENT — PAIN - FUNCTIONAL ASSESSMENT: PAIN_FUNCTIONAL_ASSESSMENT: NONE - DENIES PAIN

## 2024-11-06 NOTE — ED TRIAGE NOTES
Patient had a miscarriage about 6 weeks ago. Patient continues with headache. Vision has been \"going in and out\" for the past 3 months. + frequent urination. + vomiting.

## 2024-11-06 NOTE — ED NOTES
4:13 PM  I have evaluated the patient as the Provider in Rapid Medical Evaluation (RME). I have reviewed her vital signs and the triage nurse assessment. I have talked with the patient and any available family and advised that I am the provider in triage and have ordered the appropriate study to initiate their work up based on the clinical presentation during my assessment. I have advised that the patient will be accommodated in the Main ED as soon as possible. I have also requested to contact the triage nurse or myself immediately if the patient experiences any changes in their condition during this brief waiting period.    24-year-old female with no significant past medical history presents with complaint of miscarriage that occurred about 6 weeks ago.  Patient was seen and evaluated here shortly after having a positive pregnancy test at home.  She then developed bleeding.  She was diagnosed with a miscarriage.  Patient reports having intermittent headaches and vision changes.  She reports that her vision goes intermittently blurry and then back to normal.  This something going on for about the last 3 months.  She also reports daily vomiting and frequent urination.  Denies visual disturbances at this time.  Denies numbness or tingling.  Denies chest pain or shortness of breath.  Has had a normal menstrual cycle since.    EDGARD Quach Andrea K, PA-C  11/06/24 3925

## 2024-11-06 NOTE — DISCHARGE INSTRUCTIONS
You presented to the ED with headache and some intermittent blurred vision for an extended period of time.  Workup here in the ED is reassuring.  CT of the head shows no acute findings.  Headache resolved with treatment here in the ED.  Medications prescribed for future headaches.  Referral to neurology placed.  Recommend taking medications at the first sign of headache and then going down or lying in a dark room.  I would recommend taking the naproxen/Naprosyn and the Reglan/metoclopramide.  If that does not work you can add on the Fioricet.  If headaches persist and become intractable return to the ED for further treatment and evaluation

## 2024-11-07 NOTE — ED PROVIDER NOTES
EMERGENCY DEPARTMENT PHYSICIAN NOTE     Patient: Tierra Breaux     Time of Service: 11/6/2024  5:08 PM     Chief complaint: No chief complaint on file.       HISTORY:  Patient is a 24 y.o. female who presents to the emergency department with complaints of persistent headache and some intermittent blurred vision.  No acute neurodeficits at this time.  No fevers.  Vital signs are stable.  Patient is afebrile.  Patient symptoms have been ongoing for months to over a year.  Patient also reports frequent urination.  Patient has been seen by ophthalmologist and needs glasses and wears them but does not fix her headaches.      Past Medical History:   Diagnosis Date    Chlamydia     Depression     Overweight (BMI 25.0-29.9)         History reviewed. No pertinent surgical history.     Family History   Problem Relation Age of Onset    Kidney Disease Maternal Grandfather     Kidney Disease Maternal Uncle     Colon Cancer Father     No Known Problems Mother     Diabetes Paternal Grandmother         Social History     Socioeconomic History    Marital status: Single     Spouse name: None    Number of children: None    Years of education: None    Highest education level: None   Tobacco Use    Smoking status: Never    Smokeless tobacco: Never   Substance and Sexual Activity    Alcohol use: Not Currently    Drug use: No   Social History Narrative    Senior in [employer] Wants to go to college for nursing degree. Either [employer] or [employer].        Current Medications: Reviewed in chart.    Allergies:   Allergies   Allergen Reactions    Cherry Flavor Other (See Comments)     Throat swells          REVIEW OF SYSTEMS: See HPI for pertinent positives and negatives.      PHYSICAL EXAM:  BP (!) 100/56   Pulse 76   Temp 98.2 °F (36.8 °C) (Oral)   Resp 18   Ht 1.702 m (5' 7\")   Wt 104.5 kg (230 lb 6.1 oz)   LMP 10/19/2024   SpO2 99%   BMI 36.08 kg/m²    Physical Exam  Vitals and nursing note reviewed.   Constitutional:

## 2025-03-05 ENCOUNTER — HOSPITAL ENCOUNTER (EMERGENCY)
Facility: HOSPITAL | Age: 25
Discharge: HOME | End: 2025-03-05
Attending: EMERGENCY MEDICINE
Payer: MEDICAID

## 2025-03-05 VITALS
DIASTOLIC BLOOD PRESSURE: 50 MMHG | TEMPERATURE: 97.5 F | BODY MASS INDEX: 36.68 KG/M2 | SYSTOLIC BLOOD PRESSURE: 118 MMHG | WEIGHT: 233.69 LBS | RESPIRATION RATE: 18 BRPM | HEART RATE: 86 BPM | OXYGEN SATURATION: 98 % | HEIGHT: 67 IN

## 2025-03-05 DIAGNOSIS — A59.01 TRICHOMONAS VAGINALIS (TV) INFECTION: Primary | ICD-10-CM

## 2025-03-05 PROCEDURE — 99283 EMERGENCY DEPT VISIT LOW MDM: CPT

## 2025-03-05 RX ORDER — METRONIDAZOLE 500 MG/1
500 TABLET ORAL 2 TIMES DAILY
Qty: 14 TABLET | Refills: 0 | Status: SHIPPED | OUTPATIENT
Start: 2025-03-05 | End: 2025-03-12

## 2025-03-05 ASSESSMENT — ENCOUNTER SYMPTOMS
VOMITING: 0
DIARRHEA: 1
COUGH: 0

## 2025-03-05 ASSESSMENT — PAIN - FUNCTIONAL ASSESSMENT: PAIN_FUNCTIONAL_ASSESSMENT: 0-10

## 2025-03-05 ASSESSMENT — PAIN SCALES - GENERAL: PAINLEVEL_OUTOF10: 0

## 2025-03-05 NOTE — ED TRIAGE NOTES
Pt ambulatory of ED with cc of STD. Pt reports she tested positive for trichomonas and wants to get a prescription for it.  Denies abnormal vaginal discharge, endorses foul smell.

## 2025-03-05 NOTE — ED PROVIDER NOTES
[facility] [place] EMERGENCY DEPARTMENT  EMERGENCY DEPARTMENT ENCOUNTER      Pt Name: Tierra Breaux  MRN: [id number]  Birthdate 2000  Date of evaluation: 3/5/2025  Provider: BARBY Ureña    CHIEF COMPLAINT       Chief Complaint   Patient presents with    Exposure to STD         HISTORY OF PRESENT ILLNESS   (Location/Symptom, Timing/Onset, Context/Setting, Quality, Duration, Modifying Factors, Severity)  Note limiting factors.   25 y/o female presenting with complaint of STD.  The patient states that she had her yearly physical with her OB/GYN doctor last week, and was notified today that she tested positive for trichomonas. She denies fever, dysuria, vaginal discharge, genital sores or pelvic pain. There are no other acute medical concerns at this time.    The history is provided by the patient.         Review of External Medical Records:     Nursing Notes were reviewed.    REVIEW OF SYSTEMS    (2-9 systems for level 4, 10 or more for level 5)     Review of Systems   Constitutional:  Negative for chills and fever.   HENT:  Negative for congestion.    Respiratory:  Negative for cough.    Gastrointestinal:  Positive for diarrhea. Negative for vomiting.   Genitourinary:  Negative for dysuria, genital sores, pelvic pain, vaginal discharge and vaginal pain.   Musculoskeletal:  Negative for myalgias.   Neurological:  Negative for syncope.       Except as noted above the remainder of the review of systems was reviewed and negative.       PAST MEDICAL HISTORY     Past Medical History:   Diagnosis Date    Chlamydia     Depression     Overweight (BMI 25.0-29.9)          SURGICAL HISTORY     No past surgical history on file.      CURRENT MEDICATIONS       Previous Medications    ACETAMINOPHEN (TYLENOL) 325 MG TABLET    Take by mouth every 4 hours as needed    BUTALBITAL-ACETAMINOPHEN-CAFFEINE (FIORICET, ESGIC) -40 MG PER TABLET    Take 1 tablet by mouth every 4 hours as needed for Headaches    LIDOCAINE HCL 4 %

## 2025-05-11 ENCOUNTER — HOSPITAL ENCOUNTER (EMERGENCY)
Facility: HOSPITAL | Age: 25
Discharge: HOME | End: 2025-05-11
Attending: STUDENT IN AN ORGANIZED HEALTH CARE EDUCATION/TRAINING PROGRAM
Payer: MEDICAID

## 2025-05-11 VITALS
BODY MASS INDEX: 36.23 KG/M2 | DIASTOLIC BLOOD PRESSURE: 73 MMHG | WEIGHT: 230.82 LBS | OXYGEN SATURATION: 99 % | TEMPERATURE: 98 F | RESPIRATION RATE: 18 BRPM | HEART RATE: 76 BPM | HEIGHT: 67 IN | SYSTOLIC BLOOD PRESSURE: 118 MMHG

## 2025-05-11 DIAGNOSIS — B37.31 VAGINAL YEAST INFECTION: Primary | ICD-10-CM

## 2025-05-11 LAB
APPEARANCE UR: CLEAR
BACTERIA URNS QL MICRO: NEGATIVE /HPF
BILIRUB UR QL: NEGATIVE
CLUE CELLS VAG QL WET PREP: ABNORMAL
COLOR UR: ABNORMAL
EPITH CASTS URNS QL MICRO: ABNORMAL /LPF
GLUCOSE UR STRIP.AUTO-MCNC: NEGATIVE MG/DL
HCG UR QL: NEGATIVE
HGB UR QL STRIP: ABNORMAL
HYALINE CASTS URNS QL MICRO: ABNORMAL /LPF (ref 0–5)
KETONES UR QL STRIP.AUTO: NEGATIVE MG/DL
LEUKOCYTE ESTERASE UR QL STRIP.AUTO: NEGATIVE
NITRITE UR QL STRIP.AUTO: NEGATIVE
PH UR STRIP: 6 (ref 5–8)
PROT UR STRIP-MCNC: NEGATIVE MG/DL
RBC #/AREA URNS HPF: ABNORMAL /HPF (ref 0–5)
SP GR UR REFRACTOMETRY: 1.01 (ref 1–1.03)
SPECIMEN HOLD: NORMAL
T VAGINALIS VAG QL WET PREP: ABNORMAL
UROBILINOGEN UR QL STRIP.AUTO: 0.2 EU/DL (ref 0.2–1)
WBC URNS QL MICRO: ABNORMAL /HPF (ref 0–4)
YEAST WET PREP: ABNORMAL

## 2025-05-11 PROCEDURE — 6370000000 HC RX 637 (ALT 250 FOR IP): Performed by: STUDENT IN AN ORGANIZED HEALTH CARE EDUCATION/TRAINING PROGRAM

## 2025-05-11 PROCEDURE — 87491 CHLMYD TRACH DNA AMP PROBE: CPT

## 2025-05-11 PROCEDURE — 81001 URINALYSIS AUTO W/SCOPE: CPT

## 2025-05-11 PROCEDURE — 81025 URINE PREGNANCY TEST: CPT

## 2025-05-11 PROCEDURE — 87591 N.GONORRHOEAE DNA AMP PROB: CPT

## 2025-05-11 PROCEDURE — 99283 EMERGENCY DEPT VISIT LOW MDM: CPT

## 2025-05-11 PROCEDURE — 87210 SMEAR WET MOUNT SALINE/INK: CPT

## 2025-05-11 RX ORDER — FLUCONAZOLE 100 MG/1
200 TABLET ORAL ONCE
Status: COMPLETED | OUTPATIENT
Start: 2025-05-11 | End: 2025-05-11

## 2025-05-11 RX ORDER — FLUCONAZOLE 150 MG/1
150 TABLET ORAL
Qty: 1 TABLET | Refills: 0 | Status: SHIPPED | OUTPATIENT
Start: 2025-05-14

## 2025-05-11 RX ADMIN — FLUCONAZOLE 200 MG: 100 TABLET ORAL at 13:01

## 2025-05-11 ASSESSMENT — PAIN - FUNCTIONAL ASSESSMENT: PAIN_FUNCTIONAL_ASSESSMENT: NONE - DENIES PAIN

## 2025-05-11 NOTE — ED PROVIDER NOTES
[facility] EMERGENCY DEPARTMENT  EMERGENCY DEPARTMENT ENCOUNTER      Pt Name: Tierra Breaux  MRN: [id number]  Birthdate 2000  Date of evaluation: 5/11/2025  Provider: Kirstie Jasmine DO    CHIEF COMPLAINT       Chief Complaint   Patient presents with    Vaginal Itching       PMH   Past Medical History:   Diagnosis Date    Chlamydia     Depression     Overweight (BMI 25.0-29.9)          MDM:   Vitals:    Vitals:    05/11/25 1026   BP: 118/73   Pulse: 76   Resp: 18   Temp: 98 °F (36.7 °C)   SpO2: 99%           This is a 24 y.o. female with pmhx trich who presents today for cc of vaginal itching . Patient notes 1 day of vaginal itching at the opening, no discharge. Just finished her period, which started 5/5 and just ended from which she has minimal residual spotting which is normal for her. She is concerned for STDs but declines empiric treatment today. States she looked at her vagina herself with her phone camera/mirror and did not notice any lesions or rashes  . Otherwise denies fever, chills, abdominal pain, nausea, vomiting, urinary symptoms, and changes in bowel habits.    On arrival VS stable.   General: Alert, no acute distress  HEENT: Normocephalic, atraumatic. EOMI,  moist oral mucosa, no conjunctival injection  Neck: ROM normal, supple  Cardio: Heart regular rate and regular rhythm, cap refill <2seconds  Lungs: no respiratory distress, lungs CTAB, no wheezes, rhonchi or rales    Abdomen: abdomen soft, nontender  Genital: chaperoned pelvic exam, no external lesions or rashes, no trauma. In the vaginal vault, scant white chunky discharge present. No CMT.   MSK: ROM normal, no LE edema   Skin: Warm, dry, no rash  Neuro: No focal neurodeficits, AOx3     Labs notable for positive yeast infection. Given dose of diflucan here and Rx to go for 72hr post first dose and if she still has symptoms. She will follow up with pcp/obgyn.  Again declined empiric STD treatment and would prefer to wait for

## 2025-05-11 NOTE — ED NOTES
Discharge instructions reviewed with pt by provider. pt verbalized understanding of discharge instructions. Copy of discharge paperwork given. Patient condition stable, respiratory status within normal limits, neuro status intact. ambulatory out of er

## 2025-05-19 ENCOUNTER — HOSPITAL ENCOUNTER (INPATIENT)
Facility: HOSPITAL | Age: 25
LOS: 1 days | Discharge: HOME | DRG: 754 | End: 2025-05-20
Attending: EMERGENCY MEDICINE | Admitting: PSYCHIATRY & NEUROLOGY
Payer: MEDICAID

## 2025-05-19 DIAGNOSIS — R45.851 DEPRESSION WITH SUICIDAL IDEATION: Primary | ICD-10-CM

## 2025-05-19 DIAGNOSIS — F32.A DEPRESSION WITH SUICIDAL IDEATION: Primary | ICD-10-CM

## 2025-05-19 LAB
ALBUMIN SERPL-MCNC: 3.6 G/DL (ref 3.5–5)
ALBUMIN/GLOB SERPL: 1 (ref 1.1–2.2)
ALP SERPL-CCNC: 65 U/L (ref 45–117)
ALT SERPL-CCNC: 19 U/L (ref 12–78)
ANION GAP SERPL CALC-SCNC: 5 MMOL/L (ref 2–12)
APAP SERPL-MCNC: <2 UG/ML (ref 10–30)
AST SERPL-CCNC: 23 U/L (ref 15–37)
BASOPHILS # BLD: 0.02 K/UL (ref 0–0.1)
BASOPHILS NFR BLD: 0.2 % (ref 0–1)
BILIRUB SERPL-MCNC: 0.2 MG/DL (ref 0.2–1)
BUN SERPL-MCNC: 9 MG/DL (ref 6–20)
BUN/CREAT SERPL: 13 (ref 12–20)
CALCIUM SERPL-MCNC: 10.6 MG/DL (ref 8.5–10.1)
CHLORIDE SERPL-SCNC: 108 MMOL/L (ref 97–108)
CO2 SERPL-SCNC: 26 MMOL/L (ref 21–32)
COMMENT:: NORMAL
CREAT SERPL-MCNC: 0.68 MG/DL (ref 0.55–1.02)
DIFFERENTIAL METHOD BLD: ABNORMAL
EOSINOPHIL # BLD: 0.06 K/UL (ref 0–0.4)
EOSINOPHIL NFR BLD: 0.6 % (ref 0–7)
ERYTHROCYTE [DISTWIDTH] IN BLOOD BY AUTOMATED COUNT: 12.4 % (ref 11.5–14.5)
ETHANOL SERPL-MCNC: <10 MG/DL (ref 0–0.08)
GLOBULIN SER CALC-MCNC: 3.7 G/DL (ref 2–4)
GLUCOSE SERPL-MCNC: 111 MG/DL (ref 65–100)
HCT VFR BLD AUTO: 39.5 % (ref 35–47)
HGB BLD-MCNC: 12.5 G/DL (ref 11.5–16)
IMM GRANULOCYTES # BLD AUTO: 0.02 K/UL (ref 0–0.04)
IMM GRANULOCYTES NFR BLD AUTO: 0.2 % (ref 0–0.5)
LYMPHOCYTES # BLD: 3.71 K/UL (ref 0.8–3.5)
LYMPHOCYTES NFR BLD: 36.7 % (ref 12–49)
MCH RBC QN AUTO: 28.7 PG (ref 26–34)
MCHC RBC AUTO-ENTMCNC: 31.6 G/DL (ref 30–36.5)
MCV RBC AUTO: 90.6 FL (ref 80–99)
MONOCYTES # BLD: 0.36 K/UL (ref 0–1)
MONOCYTES NFR BLD: 3.6 % (ref 5–13)
NEUTS SEG # BLD: 5.93 K/UL (ref 1.8–8)
NEUTS SEG NFR BLD: 58.7 % (ref 32–75)
NRBC # BLD: 0 K/UL (ref 0–0.01)
NRBC BLD-RTO: 0 PER 100 WBC
PLATELET # BLD AUTO: 426 K/UL (ref 150–400)
PMV BLD AUTO: 9.1 FL (ref 8.9–12.9)
POTASSIUM SERPL-SCNC: 3.6 MMOL/L (ref 3.5–5.1)
PROT SERPL-MCNC: 7.3 G/DL (ref 6.4–8.2)
RBC # BLD AUTO: 4.36 M/UL (ref 3.8–5.2)
SALICYLATES SERPL-MCNC: <1.7 MG/DL (ref 2.8–20)
SODIUM SERPL-SCNC: 139 MMOL/L (ref 136–145)
SPECIMEN HOLD: NORMAL
WBC # BLD AUTO: 10.1 K/UL (ref 3.6–11)

## 2025-05-19 PROCEDURE — 80179 DRUG ASSAY SALICYLATE: CPT

## 2025-05-19 PROCEDURE — 99283 EMERGENCY DEPT VISIT LOW MDM: CPT

## 2025-05-19 PROCEDURE — 85025 COMPLETE CBC W/AUTO DIFF WBC: CPT

## 2025-05-19 PROCEDURE — 80143 DRUG ASSAY ACETAMINOPHEN: CPT

## 2025-05-19 PROCEDURE — 81001 URINALYSIS AUTO W/SCOPE: CPT

## 2025-05-19 PROCEDURE — 90791 PSYCH DIAGNOSTIC EVALUATION: CPT | Performed by: SOCIAL WORKER

## 2025-05-19 PROCEDURE — 80053 COMPREHEN METABOLIC PANEL: CPT

## 2025-05-19 PROCEDURE — 82077 ASSAY SPEC XCP UR&BREATH IA: CPT

## 2025-05-19 PROCEDURE — 80307 DRUG TEST PRSMV CHEM ANLYZR: CPT

## 2025-05-19 PROCEDURE — 81025 URINE PREGNANCY TEST: CPT

## 2025-05-19 ASSESSMENT — PAIN - FUNCTIONAL ASSESSMENT: PAIN_FUNCTIONAL_ASSESSMENT: NONE - DENIES PAIN

## 2025-05-20 VITALS
WEIGHT: 232.81 LBS | BODY MASS INDEX: 36.54 KG/M2 | RESPIRATION RATE: 16 BRPM | OXYGEN SATURATION: 99 % | SYSTOLIC BLOOD PRESSURE: 123 MMHG | DIASTOLIC BLOOD PRESSURE: 77 MMHG | HEART RATE: 78 BPM | TEMPERATURE: 97.7 F | HEIGHT: 67 IN

## 2025-05-20 PROBLEM — F33.0 MDD (MAJOR DEPRESSIVE DISORDER), RECURRENT EPISODE, MILD: Status: ACTIVE | Noted: 2025-05-20

## 2025-05-20 LAB
AMPHET UR QL SCN: NEGATIVE
APPEARANCE UR: ABNORMAL
BACTERIA URNS QL MICRO: NEGATIVE /HPF
BARBITURATES UR QL SCN: NEGATIVE
BENZODIAZ UR QL: NEGATIVE
BILIRUB UR QL: NEGATIVE
CANNABINOIDS UR QL SCN: NEGATIVE
COCAINE UR QL SCN: NEGATIVE
COLOR UR: ABNORMAL
EPITH CASTS URNS QL MICRO: ABNORMAL /LPF
GLUCOSE UR STRIP.AUTO-MCNC: NEGATIVE MG/DL
HCG UR QL: NEGATIVE
HGB UR QL STRIP: NEGATIVE
HYALINE CASTS URNS QL MICRO: ABNORMAL /LPF (ref 0–5)
KETONES UR QL STRIP.AUTO: ABNORMAL MG/DL
LEUKOCYTE ESTERASE UR QL STRIP.AUTO: NEGATIVE
Lab: NORMAL
METHADONE UR QL: NEGATIVE
NITRITE UR QL STRIP.AUTO: NEGATIVE
OPIATES UR QL: NEGATIVE
PCP UR QL: NEGATIVE
PH UR STRIP: 6 (ref 5–8)
PROT UR STRIP-MCNC: NEGATIVE MG/DL
RBC #/AREA URNS HPF: ABNORMAL /HPF (ref 0–5)
SP GR UR REFRACTOMETRY: 1.02 (ref 1–1.03)
SPECIMEN HOLD: NORMAL
UROBILINOGEN UR QL STRIP.AUTO: 1 EU/DL (ref 0.2–1)
WBC URNS QL MICRO: ABNORMAL /HPF (ref 0–4)

## 2025-05-20 PROCEDURE — 1100000000 HC RM PRIVATE

## 2025-05-20 ASSESSMENT — ENCOUNTER SYMPTOMS
DIARRHEA: 0
VOMITING: 0
SORE THROAT: 0
SHORTNESS OF BREATH: 0
NAUSEA: 0
ABDOMINAL PAIN: 0
EYE PAIN: 0
COUGH: 0

## 2025-05-20 NOTE — ED TRIAGE NOTES
Patient in through triage with complaints of SI intermittent x6 months. Does phone therapy but does not feel like it is sufficient. Reports thoughts are worse today, attempt in the past when she was 15. Current plan is to jump off her apartment garage.

## 2025-05-20 NOTE — BSMART NOTE
Patient was accepted to [facility] bed 733 by RACQUEL Samuels on behalf of Dr. Capone. The number for report is ext [phone]. Patient's nurse notified.

## 2025-05-20 NOTE — BSMART NOTE
Bsmart progress note:    Writer spoke with Pt who reported to have no interest in admission. Writer updating Ed,Provider Renee Brucer who will follow-up with writer for disposition. Pt reported to current be in therapy that isn't working. Per chart review Pt is being recommended for admission by ALLY Whiteside.    Awaiting dispositon

## 2025-05-20 NOTE — BSMART NOTE
Bsmart progress note:    Writer still awaiting disposition from re-consult of Tele-psych to determine next steps. Pt still unwilling to stay voluntarily and requesting to discharge with safety plan. Writer updated by Ed, Provider Amita who is also still awaiting feedback from Tele-psych provider

## 2025-05-20 NOTE — BSMART NOTE
Patient states she never agreed to stay voluntarily and was under the impression that she was getting resources. She is upset stating \"I have a therapist. I need to go to work.\" It was explained to her that since she expressed SI with a plan, crisis would need to be called. MD made aware.     Per a chart review she spoke with Bsmart overnight and was agreeable to inpatient treatment, expressing a desire to only be admitted to [facility].    Called and spoke with Rolando at [facility] to request a TDO prescreen. Paperwork faxed over.

## 2025-05-20 NOTE — ED PROVIDER NOTES
[facility] EMERGENCY DEPARTMENT  EMERGENCY DEPARTMENT ENCOUNTER      Pt Name: Tierra Breaux  MRN: [id number]  Birthdate 2000  Date of evaluation: 5/19/2025  Provider: BARBY COLORADO    CHIEF COMPLAINT       Chief Complaint   Patient presents with    Suicidal         HISTORY OF PRESENT ILLNESS   (Location/Symptom, Timing/Onset, Context/Setting, Quality, Duration, Modifying Factors, Severity)  Note limiting factors.   24 y.o. female presents to ED with mental health concerns. Patient reports that for the past 6-7 months she has been having intermittent thoughts of anxiety, depression. She notes that she has been having suicidal thoughts and earlier today was considering jumping off parking garage. She reports that she has been trying to manage her symptoms outpatient but has not been able to get this setup. Denies any HI. Denies any auditory or visual hallucinations. Notes occasional EtOH use, no drug or tobacco use.  Does note previous suicidal attempt when she was 15 years old by overdose.            Review of External Medical Records:     Nursing Notes were reviewed.    REVIEW OF SYSTEMS    (2-9 systems for level 4, 10 or more for level 5)     Review of Systems   Constitutional:  Negative for chills and fever.   HENT:  Negative for congestion, ear pain and sore throat.    Eyes:  Negative for pain.   Respiratory:  Negative for cough and shortness of breath.    Cardiovascular:  Negative for chest pain.   Gastrointestinal:  Negative for abdominal pain, diarrhea, nausea and vomiting.   Genitourinary:  Negative for dysuria and flank pain.   Musculoskeletal:  Negative for myalgias.   Skin:  Negative for rash.   Neurological:  Negative for dizziness and headaches.   Hematological:  Negative for adenopathy.   Psychiatric/Behavioral:  Positive for suicidal ideas.        Except as noted above the remainder of the review of systems was reviewed and negative.       PAST MEDICAL HISTORY     Past Medical History:

## 2025-05-20 NOTE — ED NOTES
TRANSFER - OUT REPORT:    Verbal report given to Rafia VANG on Tierra Breaux  being transferred to & psych for routine progression of patient care       Report consisted of patient's Situation, Background, Assessment and   Recommendations(SBAR).     Information from the following report(s) ED Encounter Summary, ED SBAR, MAR, and Recent Results was reviewed with the receiving nurse.    [place] Fall Assessment:    Presents to emergency department  because of falls (Syncope, seizure, or loss of consciousness): No  Age > 70: No  Altered Mental Status, Intoxication with alcohol or substance confusion (Disorientation, impaired judgment, poor safety awaremess, or inability to follow instructions): No  Impaired Mobility: Ambulates or transfers with assistive devices or assistance; Unable to ambulate or transer.: No  Nursing Judgement: No          Lines:       Opportunity for questions and clarification was provided.      Patient transported with:  Tech and security

## 2025-05-20 NOTE — BSMART NOTE
Bsmart Progress Note:    Pt is now voluntary and agreeable to admission into U . Pt has not been assessed for TDO prescreen yet. Madeline made aware of update. Pt reporting to only be open to [facility] and requesting to hold until bed is available. Pt declined outside bed search . Bed access and crisis made aware

## 2025-05-20 NOTE — BSMART NOTE
Bsmart progress note:    Writer updated by Ed, Provider Amita requesting to have [facility] prescreen for TDO    Madeline /[facility] made aware of request. Clinical faxed during this time.  Clinician shared that Pt is in que to be seen due to prior assessments.

## 2025-05-20 NOTE — BH NOTE
Per Tigist Samuels NP- patient does not need a 1:1 while on BHU.  Once room 733 at [facility] opens today, Arvind CLEMENT/ Dr Capone accepts patient.   [phone] for report.    Completed tx with Tamiflu on 2/15

## 2025-05-20 NOTE — ED NOTES
Pt has bed ready upstairs. This RN tried to get pt to move upstairs but she refused. Pt became agitated and stated she does not want to stay and would like to leave. Non emergency number contacted. [employer] already present in ED and speaking with pt along with BSMART.

## 2025-05-20 NOTE — ED NOTES
Patient signed out to me at 7a by Dr Arellano  24 y.o. female TDO awaiting bed.      Admitted to psychiatric inpatient unit here  No acute intervention required while under my care      12:23 PM as pt was getting ready to go upstairs she reported that she did not want inpatient admission. Bmsart aware and involving crisis to assess for TDO.    Seen by [facility]. Feel they can do a safety plan with this patient and that she is ok for dc home. Has strong family support. [facility] to reach out today for close follow up. I evaluated pt, agrees with plan, feels safe, denies active SI.      Arnav Erwin, DO  05/20/25 1224       Arnav Erwin, DO  05/20/25 1409
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
## 2025-06-30 ENCOUNTER — HOSPITAL ENCOUNTER (EMERGENCY)
Facility: HOSPITAL | Age: 25
Discharge: HOME | End: 2025-06-30
Attending: EMERGENCY MEDICINE
Payer: MEDICAID

## 2025-06-30 ENCOUNTER — APPOINTMENT (OUTPATIENT)
Facility: HOSPITAL | Age: 25
End: 2025-06-30
Payer: MEDICAID

## 2025-06-30 VITALS
TEMPERATURE: 98.6 F | OXYGEN SATURATION: 100 % | SYSTOLIC BLOOD PRESSURE: 111 MMHG | BODY MASS INDEX: 35.22 KG/M2 | HEART RATE: 92 BPM | DIASTOLIC BLOOD PRESSURE: 74 MMHG | WEIGHT: 224.43 LBS | HEIGHT: 67 IN | RESPIRATION RATE: 20 BRPM

## 2025-06-30 DIAGNOSIS — N83.201 RIGHT OVARIAN CYST: ICD-10-CM

## 2025-06-30 DIAGNOSIS — R10.9 ABDOMINAL CRAMPING: ICD-10-CM

## 2025-06-30 DIAGNOSIS — Z34.90 EARLY STAGE OF PREGNANCY: Primary | ICD-10-CM

## 2025-06-30 LAB
ABO + RH BLD: NORMAL
ALBUMIN SERPL-MCNC: 3.9 G/DL (ref 3.5–5)
ALBUMIN/GLOB SERPL: 1.1 (ref 1.1–2.2)
ALP SERPL-CCNC: 44 U/L (ref 45–117)
ALT SERPL-CCNC: 17 U/L (ref 12–78)
ANION GAP SERPL CALC-SCNC: 6 MMOL/L (ref 2–12)
APPEARANCE UR: CLEAR
AST SERPL-CCNC: 15 U/L (ref 15–37)
BACTERIA URNS QL MICRO: NEGATIVE /HPF
BASOPHILS # BLD: 0.03 K/UL (ref 0–0.1)
BASOPHILS NFR BLD: 0.3 % (ref 0–1)
BILIRUB SERPL-MCNC: 0.6 MG/DL (ref 0.2–1)
BILIRUB UR QL: NEGATIVE
BUN SERPL-MCNC: 8 MG/DL (ref 6–20)
BUN/CREAT SERPL: 11 (ref 12–20)
CALCIUM SERPL-MCNC: 9.5 MG/DL (ref 8.5–10.1)
CHLORIDE SERPL-SCNC: 107 MMOL/L (ref 97–108)
CO2 SERPL-SCNC: 23 MMOL/L (ref 21–32)
COLOR UR: ABNORMAL
COMMENT:: NORMAL
CREAT SERPL-MCNC: 0.7 MG/DL (ref 0.55–1.02)
DIFFERENTIAL METHOD BLD: ABNORMAL
EOSINOPHIL # BLD: 0.08 K/UL (ref 0–0.4)
EOSINOPHIL NFR BLD: 0.8 % (ref 0–7)
EPITH CASTS URNS QL MICRO: ABNORMAL /LPF
ERYTHROCYTE [DISTWIDTH] IN BLOOD BY AUTOMATED COUNT: 12.7 % (ref 11.5–14.5)
GLOBULIN SER CALC-MCNC: 3.6 G/DL (ref 2–4)
GLUCOSE SERPL-MCNC: 90 MG/DL (ref 65–100)
GLUCOSE UR STRIP.AUTO-MCNC: NEGATIVE MG/DL
HCG SERPL-ACNC: 51 MIU/ML (ref 0–6)
HCG UR QL: POSITIVE
HCT VFR BLD AUTO: 41.6 % (ref 35–47)
HGB BLD-MCNC: 13.2 G/DL (ref 11.5–16)
HGB UR QL STRIP: ABNORMAL
HYALINE CASTS URNS QL MICRO: ABNORMAL /LPF (ref 0–5)
IMM GRANULOCYTES # BLD AUTO: 0.03 K/UL (ref 0–0.04)
IMM GRANULOCYTES NFR BLD AUTO: 0.3 % (ref 0–0.5)
KETONES UR QL STRIP.AUTO: NEGATIVE MG/DL
LEUKOCYTE ESTERASE UR QL STRIP.AUTO: NEGATIVE
LYMPHOCYTES # BLD: 2.92 K/UL (ref 0.8–3.5)
LYMPHOCYTES NFR BLD: 27.6 % (ref 12–49)
MCH RBC QN AUTO: 28.9 PG (ref 26–34)
MCHC RBC AUTO-ENTMCNC: 31.7 G/DL (ref 30–36.5)
MCV RBC AUTO: 91.2 FL (ref 80–99)
MONOCYTES # BLD: 0.4 K/UL (ref 0–1)
MONOCYTES NFR BLD: 3.8 % (ref 5–13)
NEUTS SEG # BLD: 7.12 K/UL (ref 1.8–8)
NEUTS SEG NFR BLD: 67.2 % (ref 32–75)
NITRITE UR QL STRIP.AUTO: NEGATIVE
NRBC # BLD: 0 K/UL (ref 0–0.01)
NRBC BLD-RTO: 0 PER 100 WBC
PH UR STRIP: 5.5 (ref 5–8)
PLATELET # BLD AUTO: 400 K/UL (ref 150–400)
PMV BLD AUTO: 10.2 FL (ref 8.9–12.9)
POTASSIUM SERPL-SCNC: 3.6 MMOL/L (ref 3.5–5.1)
PROT SERPL-MCNC: 7.5 G/DL (ref 6.4–8.2)
PROT UR STRIP-MCNC: NEGATIVE MG/DL
RBC # BLD AUTO: 4.56 M/UL (ref 3.8–5.2)
RBC #/AREA URNS HPF: ABNORMAL /HPF (ref 0–5)
SODIUM SERPL-SCNC: 136 MMOL/L (ref 136–145)
SP GR UR REFRACTOMETRY: 1.01 (ref 1–1.03)
SPECIMEN HOLD: NORMAL
SPECIMEN HOLD: NORMAL
UROBILINOGEN UR QL STRIP.AUTO: 0.2 EU/DL (ref 0.2–1)
WBC # BLD AUTO: 10.6 K/UL (ref 3.6–11)
WBC URNS QL MICRO: ABNORMAL /HPF (ref 0–4)

## 2025-06-30 PROCEDURE — 86900 BLOOD TYPING SEROLOGIC ABO: CPT

## 2025-06-30 PROCEDURE — 86901 BLOOD TYPING SEROLOGIC RH(D): CPT

## 2025-06-30 PROCEDURE — 81025 URINE PREGNANCY TEST: CPT

## 2025-06-30 PROCEDURE — 84702 CHORIONIC GONADOTROPIN TEST: CPT

## 2025-06-30 PROCEDURE — 76801 OB US < 14 WKS SINGLE FETUS: CPT

## 2025-06-30 PROCEDURE — 81001 URINALYSIS AUTO W/SCOPE: CPT

## 2025-06-30 PROCEDURE — 99284 EMERGENCY DEPT VISIT MOD MDM: CPT

## 2025-06-30 PROCEDURE — 76817 TRANSVAGINAL US OBSTETRIC: CPT

## 2025-06-30 PROCEDURE — 85025 COMPLETE CBC W/AUTO DIFF WBC: CPT

## 2025-06-30 PROCEDURE — 80053 COMPREHEN METABOLIC PANEL: CPT

## 2025-06-30 ASSESSMENT — PAIN - FUNCTIONAL ASSESSMENT
PAIN_FUNCTIONAL_ASSESSMENT: ACTIVITIES ARE NOT PREVENTED
PAIN_FUNCTIONAL_ASSESSMENT: 0-10

## 2025-06-30 ASSESSMENT — PAIN DESCRIPTION - PAIN TYPE: TYPE: ACUTE PAIN

## 2025-06-30 ASSESSMENT — PAIN DESCRIPTION - FREQUENCY: FREQUENCY: INTERMITTENT

## 2025-06-30 ASSESSMENT — PAIN DESCRIPTION - ORIENTATION: ORIENTATION: LOWER

## 2025-06-30 ASSESSMENT — PAIN SCALES - GENERAL: PAINLEVEL_OUTOF10: 3

## 2025-06-30 ASSESSMENT — PAIN DESCRIPTION - LOCATION: LOCATION: ABDOMEN

## 2025-06-30 ASSESSMENT — PAIN DESCRIPTION - ONSET: ONSET: ON-GOING

## 2025-06-30 ASSESSMENT — PAIN DESCRIPTION - DESCRIPTORS: DESCRIPTORS: CRAMPING

## 2025-06-30 NOTE — DISCHARGE INSTRUCTIONS
You were seen here today for abdominal cramping and early pregnancy.  Your blood work showed an hCG, which is the hormone of pregnancy, elevated at 51.  This is consistent with very early pregnancy.  Likely less than 3 weeks along.  Your ultrasound did not confirm the location of the pregnancy because it is so early.  You do have a right ovarian cyst, which is benign.  This cramping could be consistent with early miscarriage, normal early pregnancy, or an ectopic pregnancy (which is a pregnancy located outside the uterus).  I recommend that you follow-up with your OB/GYN.  Return to the emergency department for any new or worsening symptoms including, but not limited to, worsening pain (especially on one side of the abdomen or the other) or bleeding.

## 2025-06-30 NOTE — ED TRIAGE NOTES
Pt comes to ED from home with reports of abdominal cramping and states she is 4 weeks pregnant with home pregnancy test.     ABRBY Echevarria assessing pt in triage.

## 2025-06-30 NOTE — ED PROVIDER NOTES
[facility] EMERGENCY DEPARTMENT  EMERGENCY DEPARTMENT ENCOUNTER      Pt Name: Tierra Breaux  MRN: [id number]  Birthdate 2000  Date of evaluation: 2025  Provider: Wale Schmidt PA-C    CHIEF COMPLAINT       Chief Complaint   Patient presents with    Pregnancy Test    Abdominal Cramping         HISTORY OF PRESENT ILLNESS   (Location/Symptom, Timing/Onset, Context/Setting, Quality, Duration, Modifying Factors, Severity)  Note limiting factors.   24-year-old female  presents with complaint of lower abdominal cramping.  Patient states cramping started yesterday.  Last menstrual cycle was early .  According to her phone, she ovulated on .  She had a positive pregnancy test 2 days ago.  Reports history of miscarriage last year.  Denies fever, nausea, vomiting, dysuria, hematuria, vaginal bleeding/discharge.  Wants to be checked out as she is leaving to go abroad in a few days for 2 weeks.    The history is provided by the patient.         Review of External Medical Records:     Nursing Notes were reviewed.    REVIEW OF SYSTEMS    (2-9 systems for level 4, 10 or more for level 5)     Review of Systems    Except as noted above the remainder of the review of systems was reviewed and negative.       PAST MEDICAL HISTORY     Past Medical History:   Diagnosis Date    Chlamydia     Depression     Overweight (BMI 25.0-29.9)          SURGICAL HISTORY     No past surgical history on file.      CURRENT MEDICATIONS       Discharge Medication List as of 2025 11:47 AM        CONTINUE these medications which have NOT CHANGED    Details   fluconazole (DIFLUCAN) 150 MG tablet Take 1 tablet by mouth once as needed (if you have symptoms 72hr after your first dose), Disp-1 tablet, R-0Normal      naproxen (NAPROSYN) 500 MG tablet Take 1 tablet by mouth 2 times daily as needed (Headache), Disp-60 tablet, R-0Normal      metoclopramide (REGLAN) 10 MG tablet Take 1 tablet by mouth 3 times daily as needed